# Patient Record
Sex: FEMALE | Race: WHITE | Employment: FULL TIME | URBAN - METROPOLITAN AREA
[De-identification: names, ages, dates, MRNs, and addresses within clinical notes are randomized per-mention and may not be internally consistent; named-entity substitution may affect disease eponyms.]

---

## 2017-03-07 ENCOUNTER — OFFICE VISIT (OUTPATIENT)
Dept: INTERNAL MEDICINE CLINIC | Age: 37
End: 2017-03-07

## 2017-03-07 VITALS
HEART RATE: 61 BPM | WEIGHT: 180.2 LBS | BODY MASS INDEX: 28.96 KG/M2 | OXYGEN SATURATION: 99 % | SYSTOLIC BLOOD PRESSURE: 108 MMHG | HEIGHT: 66 IN | DIASTOLIC BLOOD PRESSURE: 65 MMHG | TEMPERATURE: 99.5 F | RESPIRATION RATE: 16 BRPM

## 2017-03-07 DIAGNOSIS — F41.9 ANXIETY: ICD-10-CM

## 2017-03-07 DIAGNOSIS — Z00.00 WELLNESS EXAMINATION: Primary | ICD-10-CM

## 2017-03-07 DIAGNOSIS — M79.675 PAIN OF TOE OF LEFT FOOT: ICD-10-CM

## 2017-03-07 RX ORDER — SERTRALINE HYDROCHLORIDE 100 MG/1
100 TABLET, FILM COATED ORAL DAILY
Qty: 30 TAB | Refills: 5 | Status: SHIPPED | OUTPATIENT
Start: 2017-03-07 | End: 2017-09-14 | Stop reason: SDUPTHER

## 2017-03-07 RX ORDER — FLUTICASONE PROPIONATE 50 MCG
2 SPRAY, SUSPENSION (ML) NASAL DAILY
COMMUNITY

## 2017-03-07 NOTE — MR AVS SNAPSHOT
Visit Information Date & Time Provider Department Dept. Phone Encounter #  
 3/7/2017 10:15 AM Starla Ledesma MD [facility] [address] [id number] Follow-up Instructions Return in about 6 months (around 9/7/2017). Upcoming Health Maintenance Date Due DTaP/Tdap/Td series (1 - Tdap) 6/5/2003 INFLUENZA AGE 9 TO ADULT 8/1/2016 PAP AKA CERVICAL CYTOLOGY 8/26/2019 Allergies as of 3/7/2017  Review Complete On: 3/7/2017 By: Starla Ledesma MD  
 No Known Allergies Current Immunizations  Never Reviewed No immunizations on file. Not reviewed this visit You Were Diagnosed With   
  
 Codes Comments Wellness examination    -  Primary ICD-10-CM: Z00.00 ICD-9-CM: V70.0 Pain of toe of left foot     ICD-10-CM: X76.457 ICD-9-CM: 729.5 Anxiety     ICD-10-CM: F41.9 ICD-9-CM: 300.00 Vitals BP Pulse Temp Resp Height(growth percentile) Weight(growth percentile) 108/65 (BP 1 Location: Left arm, BP Patient Position: Sitting) 61 99.5 °F (37.5 °C) (Oral) 16 5' 6\" (1.676 m) 180 lb 3.2 oz (81.7 kg) LMP SpO2 BMI OB Status Smoking Status 02/14/2017 (Exact Date) 99% 29.09 kg/m2 Having regular periods Former Smoker Vitals History BMI and BSA Data Body Mass Index Body Surface Area  
 29.09 kg/m 2 1.95 m 2 Preferred Pharm[facility] Phone [id number], [address] [facility] [phone] Your Updated Medication List  
  
   
This list is accurate as of: 3/7/17 11:02 AM.  Always use your most recent med list.  
  
  
  
  
 FLONASE 50 mcg/actuation nasal spray Generic drug:  fluticasone 2 Sprays by Both Nostrils route daily. norgestimate-ethinyl estradiol 0.25-35 mg-mcg Tab Commonly known as:  MONONESSA (28) Take 1 Tab by mouth daily. sertraline 100 mg tablet Commonly known as:  ZOLOFT Take 1 Tab by mouth daily. Prescriptions Sent to Pharmacy Refills  
 sertraline (ZOLOFT) 100 mg tablet 5 Sig: Take 1 Tab by mouth daily. Class: Normal  
 Pharmacy: [address]  #: [phone] Route: Oral  
  
We Performed the Following REFERRAL TO PODIATRY [REF90 Custom] Comments:  
 Please evaluate patient for L great toe pain. Follow-up Instructions Return in about 6 months (around 9/7/2017). Referral Information Referral ID Referred By Referred To  
  
 [id number] [facility], [address] [facility] [address] Visits Status Start Date End Date 1 New Request 3/7/17 3/7/18 If your referral has a status of pending review or denied, additional information will be sent to support the outcome of this decision. Patient Instructions Well Visit, Ages 25 to 48: Care Instructions Your Care Instructions Physical exams can help you stay healthy. Your doctor has checked your overall health and may have suggested ways to take good care of yourself. He or she also may have recommended tests. At home, you can help prevent illness with healthy eating, regular exercise, and other steps. Follow-up care is a key part of your treatment and safety. Be sure to make and go to all appointments, and call your doctor if you are having problems. It's also a good idea to know your test results and keep a list of the medicines you take. How can you care for yourself at home? · Reach and stay at a healthy weight. This will lower your risk for many problems, such as obesity, diabetes, heart disease, and high blood pressure. · Get at least 30 minutes of physical activity on most days of the week. Walking is a good choice. You also may want to do other activities, such as running, swimming, cycling, or playing tennis or team sports. Discuss any changes in your exercise program with your doctor. · Do not smoke or allow others to smoke around you. If you need help quitting, talk to your doctor about stop-smoking programs and medicines. These can increase your chances of quitting for good. · Talk to your doctor about whether you have any risk factors for sexually transmitted infections (STIs). Having one sex partner (who does not have STIs and does not have sex with anyone else) is a good way to avoid these infections. · Use birth control if you do not want to have children at this time. Talk with your doctor about the choices available and what might be best for you. · Protect your skin from too much sun. When you're outdoors from 10 a.m. to 4 p.m., stay in the shade or cover up with clothing and a hat with a wide brim. Wear sunglasses that block UV rays. Even when it's cloudy, put broad-spectrum sunscreen (SPF 30 or higher) on any exposed skin. · See a dentist one or two times a year for checkups and to have your teeth cleaned. · Wear a seat belt in the car. · Drink alcohol in moderation, if at all. That means no more than 2 drinks a day for men and 1 drink a day for women. Follow your doctor's advice about when to have certain tests. These tests can spot problems early. For everyone · Cholesterol. Have the fat (cholesterol) in your blood tested after age 21. Your doctor will tell you how often to have this done based on your age, family history, or other things that can increase your risk for heart disease. · Blood pressure. Have your blood pressure checked during a routine doctor visit. Your doctor will tell you how often to check your blood pressure based on your age, your blood pressure results, and other factors. · Vision. Talk with your doctor about how often to have a glaucoma test. 
· Diabetes. Ask your doctor whether you should have tests for diabetes. · Colon cancer. Have a test for colon cancer at age 48.  You may have one of several tests. If you are younger than 48, you may need a test earlier if you have any risk factors. Risk factors include whether you already had a precancerous polyp removed from your colon or whether your parent, brother, sister, or child has had colon cancer. For women · Breast exam and mammogram. Talk to your doctor about when you should have a clinical breast exam and a mammogram. Medical experts differ on whether and how often women under 50 should have these tests. Your doctor can help you decide what is right for you. · Pap test and pelvic exam. Begin Pap tests at age 24. A Pap test is the best way to find cervical cancer. The test often is part of a pelvic exam. Ask how often to have this test. 
· Tests for sexually transmitted infections (STIs). Ask whether you should have tests for STIs. You may be at risk if you have sex with more than one person, especially if your partners do not wear condoms. For men · Tests for sexually transmitted infections (STIs). Ask whether you should have tests for STIs. You may be at risk if you have sex with more than one person, especially if you do not wear a condom. · Testicular cancer exam. Ask your doctor whether you should check your testicles regularly. · Prostate exam. Talk to your doctor about whether you should have a blood test (called a PSA test) for prostate cancer. Experts differ on whether and when men should have this test. Some experts suggest it if you are older than 39 and are -American or have a father or brother who got prostate cancer when he was younger than 72. When should you call for help? Watch closely for changes in your health, and be sure to contact your doctor if you have any problems or symptoms that concern you. Where can you learn more? Go to http://americo-aparna.info/. Enter P072 in the search box to learn more about \"Well Visit, Ages 25 to 48: Care Instructions. \" Current as of: July 19, 2016 Content Version: 11.1 © 8872-5028 [employer], [employer]. Care instructions adapted under license by [employer] (which disclaims liability or warranty for this information). If you have questions about a medical condition or this instruction, always ask your healthcare professional. [address] any warranty or liability for your use of this information. Introducing [facility]! Dear Richard Verdugo: Thank you for requesting a HealthyOut account. Our records indicate that you already have an active HealthyOut account. You can access your account anytime at [email or web address]. [email or web address] Did you know that you can access your hospital and ER discharge instructions at any time in HealthyOut? You can also review all of your test results from your hospital stay or ER visit. Additional Information If you have questions, please visit the Frequently Asked Questions section of the HealthyOut website at [email or web address] Remember, HealthyOut is NOT to be used for urgent needs. For medical emergencies, dial 911. Now available from your iPhone and Android! Please provide this summary of care documentation to your next provider. Your primary care clinician is listed as Joseluis Jackson. If you have any questions after today's visit, please call [phone].

## 2017-03-07 NOTE — PROGRESS NOTES
Kelly Almendarez is a 31 y.o. female who presents today for Establish Care and Foot Pain (pt c/o left foot pain x few months; unable to wear some shoes due to big toe pain )      She has a history of   Patient Active Problem List   Diagnosis Code    Anxiety F41.9    Pain of toe of left foot M79.675       Today patient is here to establish care. Previous PCP none. she is switching because establish care to establish care. Records are not available for me to review. she does have other concerns. Musculoskeletal pain:  She wishes to address discomfort in the Left large toe at today's visit. This has been moderate in nature, gradual, starting about 3 years ago in onset. Pain in certain position or with certain shoes but no  rash, erythema of joints, joint swelling has been noted by the patient. Self treatment: medication not used . she does not have a history of osteoarthritis, rheumatoid, gout joint disease. Anxiety: on SSRI. Was on lexapro in the past. Issues with poor mood and social anxiety. Now doing well on zoloft. Now taking 75mg. Pt notes some increased anxiety. Interested in going up to 100mg. Health maintenance hx includes:  Exercise: moderately active. 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 Moved here in June. At home with BF. Sexual history: HPV in the past. No pregnancies. Family: Mother with no issues. Father with HTN, HLD, CAD with stents    No cancer history. MGF with RA. Screening:    Breast cancer screening: N/A   Cervical cancer screening: last PAP/Pelvic exam: 2016   and was normal. OBGYN: Dr. Manuela Neal; On OCP    Immunizations: There is no immunization history on file for this patient. Immunization status: stated as current, but no records available.       ROS  Review of Systems   Constitutional: Negative for chills, fever and weight loss. HENT: Negative for congestion and sore throat. Eyes: Negative for blurred vision, double vision and photophobia. Respiratory: Negative for cough, hemoptysis, sputum production and shortness of breath. Cardiovascular: Negative for chest pain, palpitations, orthopnea, claudication and leg swelling. Gastrointestinal: Negative for abdominal pain, constipation, diarrhea, heartburn, nausea and vomiting. Genitourinary: Negative for dysuria, frequency and urgency. Musculoskeletal: Positive for joint pain (Toe pain). Negative for myalgias. Skin: Negative for rash. Neurological: Negative. Negative for headaches. Endo/Heme/Allergies: Does not bruise/bleed easily. Psychiatric/Behavioral: Negative for depression, hallucinations, memory loss, substance abuse and suicidal ideas. The patient is nervous/anxious. The patient does not have insomnia. Visit Vitals    /65 (BP 1 Location: Left arm, BP Patient Position: Sitting)    Pulse 61    Temp 99.5 °F (37.5 °C) (Oral)    Resp 16    Ht 5' 6\" (1.676 m)    Wt 180 lb 3.2 oz (81.7 kg)    SpO2 99%    BMI 29.09 kg/m2       Physical Exam   Constitutional: She is oriented to person, place, and time and well-developed, well-nourished, and in no distress. No distress. HENT:   Head: Normocephalic and atraumatic. Mouth/Throat: No oropharyngeal exudate. Eyes: Conjunctivae are normal. Pupils are equal, round, and reactive to light. No scleral icterus. Neck: Normal range of motion. No JVD present. No thyromegaly present. Cardiovascular: Normal rate and regular rhythm. Exam reveals no gallop and no friction rub. No murmur heard. Pulmonary/Chest: Effort normal and breath sounds normal. No respiratory distress. She has no wheezes. Abdominal: Soft. Bowel sounds are normal. She exhibits no distension. There is no rebound and no guarding. Musculoskeletal: Normal range of motion. She exhibits no edema.    No abnormality to [facility] toe. FROM. Neurological: She is alert and oriented to person, place, and time. No cranial nerve deficit. Skin: Skin is dry. No rash noted. Psychiatric: Mood, memory, affect and judgment normal.       Current Outpatient Prescriptions   Medication Sig    fluticasone (FLONASE) 50 mcg/actuation nasal spray 2 Sprays by Both Nostrils route daily.  sertraline (ZOLOFT) 100 mg tablet Take 1 Tab by mouth daily.  norgestimate-ethinyl estradiol (MONONESSA, 28,) 0.25-35 mg-mcg tab Take 1 Tab by mouth daily. No current facility-administered medications for this visit. Past Medical History:   Diagnosis Date    Anxiety     High cholesterol     HPV exposure       Past Surgical History:   Procedure Laterality Date    HX GYN  12/2013    Cryo    HX WISDOM TEETH EXTRACTION        Social History   Substance Use Topics    Smoking status: Former Smoker    Smokeless tobacco: Never Used    Alcohol use Yes      Family History   Problem Relation Age of Onset    High Cholesterol Father     Hypertension Father     Arthritis-rheumatoid Maternal Grandfather     No Known Problems Mother         No Known Allergies     Assessment/Plan  Maggie Martinez was seen today for establish care and foot pain. Diagnoses and all orders for this visit:    Wellness examination - GYN care done elsewhere. MH under good control. Discussed regular physical activity. Marquis Owens was counseled on age-appropriate/ guideline-based risk prevention behaviors and screening for a 31y.o. year old   female . We also discussed adjustments in screening based on family history if necessary. Printed instructions for preventative screening guidelines and healthy behaviors given to patient with after visit summary. Pain of toe of left foot - Chronic issue. No obvious structural abnormality. Refer to podiatry as this is becoming more symptomatic.   -     REFERRAL TO PODIATRY    Anxiety - Good control. On 75mg.  Will try 100mg for ease of dosing and due to increased stress. -     sertraline (ZOLOFT) 100 mg tablet; Take 1 Tab by mouth daily. Follow-up Disposition:  Return in about 6 months (around 9/7/2017). Daniella Zaman MD  3/7/2017      .

## 2017-03-07 NOTE — PATIENT INSTRUCTIONS

## 2017-09-01 ENCOUNTER — TELEPHONE (OUTPATIENT)
Dept: OBGYN CLINIC | Age: 37
End: 2017-09-01

## 2017-09-01 RX ORDER — NORGESTIMATE AND ETHINYL ESTRADIOL 0.25-0.035
1 KIT ORAL DAILY
Qty: 1 DOSE PACK | Refills: 0 | Status: SHIPPED | OUTPATIENT
Start: 2017-09-01 | End: 2017-09-08 | Stop reason: SDUPTHER

## 2017-09-01 NOTE — TELEPHONE ENCOUNTER
Patient has appointment for AE on 9/8/17. Prescription for one month supply of birth control sent to patient preferred pharmacy as per MD order.

## 2017-09-01 NOTE — TELEPHONE ENCOUNTER
Message left at 2:01PM      30year old patient last seen in the office on 8/26/17.  Patient left a message requesting a refill on her birth control      This nurse left a detailed message for the patient that she needs to contact the office to schedule an appointment prior to a refill being given

## 2017-09-08 ENCOUNTER — TELEPHONE (OUTPATIENT)
Dept: INTERNAL MEDICINE CLINIC | Age: 37
End: 2017-09-08

## 2017-09-08 ENCOUNTER — OFFICE VISIT (OUTPATIENT)
Dept: OBGYN CLINIC | Age: 37
End: 2017-09-08

## 2017-09-08 VITALS
BODY MASS INDEX: 28.77 KG/M2 | HEIGHT: 66 IN | SYSTOLIC BLOOD PRESSURE: 112 MMHG | WEIGHT: 179 LBS | DIASTOLIC BLOOD PRESSURE: 70 MMHG

## 2017-09-08 DIAGNOSIS — Z01.419 ENCOUNTER FOR GYNECOLOGICAL EXAMINATION WITHOUT ABNORMAL FINDING: Primary | ICD-10-CM

## 2017-09-08 DIAGNOSIS — Z01.419 ENCOUNTER FOR GYNECOLOGICAL EXAMINATION (GENERAL) (ROUTINE) WITHOUT ABNORMAL FINDINGS: Primary | ICD-10-CM

## 2017-09-08 RX ORDER — NORGESTIMATE AND ETHINYL ESTRADIOL 0.25-0.035
1 KIT ORAL DAILY
Qty: 1 DOSE PACK | Refills: 12 | Status: SHIPPED | OUTPATIENT
Start: 2017-09-08 | End: 2018-01-18 | Stop reason: SDUPTHER

## 2017-09-08 NOTE — TELEPHONE ENCOUNTER
Referral has been Obtained & Faxed To Dr Nasrin Pearce [phone]        [address] [facility] [id number]  No # Visits 99  Dates Covered 09/08/2017 - 09/08/2018

## 2017-09-08 NOTE — TELEPHONE ENCOUNTER
Patient referral      Dr. Omari Jara  [address] [place], [place]  ([phone]  Shiva Pack -  [phone]    9/8/17 3:00pm    Annual checkup

## 2017-09-08 NOTE — PROGRESS NOTES
Annual exam ages 21-44    Ed Jerry is a ,  30 y.o. female [facility] Patient's last menstrual period was 2017 (approximate). .    She presents for her annual checkup. She is having no significant problems. With regard to the Gardasil vaccine, she is older than the FDA approved age to receive it. Menstrual status:    Her periods are light, moderate in flow. She is using one to two pads or tampons per day, usually regular and last 26-30 days. She denies dysmenorrhea. She reports no premenstrual symptoms. Contraception:    The current method of family planning is OCP (estrogen/progesterone). Sexual history:     She  reports that she currently engages in sexual activity and has had male partners. She reports using the following method of birth control/protection: Pill. .    Medical conditions:    Since her last annual GYN exam about one year ago, she has not the following changes in her health history: none. Pap and Mammogram History:    Her most recent Pap smear was normal, obtained 1 year(s) ago. The patient has never had a mammogram.    Breast Cancer History/Substance Abuse: negative    Past Medical History:   Diagnosis Date    Anxiety     High cholesterol     HPV exposure     Routine Papanicolaou smear 16 neg HPV neg     Past Surgical History:   Procedure Laterality Date    HX GYN  2013    Cryo    HX WISDOM TEETH EXTRACTION         Current Outpatient Prescriptions   Medication Sig Dispense Refill    norgestimate-ethinyl estradiol (MONONESSA, 28,) 0.25-35 mg-mcg tab Take 1 Tab by mouth daily. 1 Dose Pack 0    fluticasone (FLONASE) 50 mcg/actuation nasal spray 2 Sprays by Both Nostrils route daily.  sertraline (ZOLOFT) 100 mg tablet Take 1 Tab by mouth daily. 30 Tab 5     Allergies: Review of patient's allergies indicates no known allergies. Tobacco History:  reports that she has quit smoking.  She has never used smokeless tobacco.  Alcohol Abuse:  reports that she drinks alcohol. Drug Abuse:  reports that she does not use illicit drugs.     Family Medical/Cancer History:   Family History   Problem Relation Age of Onset    High Cholesterol Father     Hypertension Father     Arthritis-rheumatoid Maternal Grandfather     No Known Problems Mother         Review of Systems - History obtained from the patient  Constitutional: negative for weight loss, fever, night sweats  HEENT: negative for hearing loss, earache, congestion, snoring, sorethroat  CV: negative for chest pain, palpitations, edema  Resp: negative for cough, shortness of breath, wheezing  GI: negative for change in bowel habits, abdominal pain, black or bloody stools  : negative for frequency, dysuria, hematuria, vaginal discharge  MSK: negative for back pain, joint pain, muscle pain  Breast: negative for breast lumps, nipple discharge, galactorrhea  Skin :negative for itching, rash, hives  Neuro: negative for dizziness, headache, confusion, weakness  Psych: negative for anxiety, depression, change in mood  Heme/lymph: negative for bleeding, bruising, pallor    Physical Exam    Visit Vitals    /70    Ht 5' 6\" (1.676 m)    Wt 179 lb (81.2 kg)    LMP 08/30/2017 (Approximate)    BMI 28.89 kg/m2       Constitutional  · Appearance: well-nourished, well developed, alert, in no acute distress    HENT  · Head and Face: appears normal    Neck  · Inspection/Palpation: normal appearance, no masses or tenderness  · Lymph Nodes: no lymphadenopathy present  · Thyroid: gland size normal, nontender, no nodules or masses present on palpation    Chest  · Respiratory Effort: breathing unlabored  · Auscultation: normal breath sounds    Cardiovascular  · Heart:  · Auscultation: regular rate and rhythm without murmur    Breasts  · Inspection of Breasts: breasts symmetrical, no skin changes, no discharge present, nipple appearance normal, no skin retraction present  · Palpation of Breasts and Axillae: no masses present on palpation, no breast tenderness  · Axillary Lymph Nodes: no lymphadenopathy present    Gastrointestinal  · Abdominal Examination: abdomen non-tender to palpation, normal bowel sounds, no masses present  · Liver and spleen: no hepatomegaly present, spleen not palpable  · Hernias: no hernias identified    Genitourinary  · External Genitalia: normal appearance for age, no discharge present, no tenderness present, no inflammatory lesions present, no masses present, no atrophy present  · Vagina: normal vaginal vault without central or paravaginal defects, no discharge present, no inflammatory lesions present, no masses present  · Bladder: non-tender to palpation  · Urethra: appears normal  · Cervix: normal   · Uterus: normal size, shape and consistency  · Adnexa: no adnexal tenderness present, no adnexal masses present  · Perineum: perineum within normal limits, no evidence of trauma, no rashes or skin lesions present  · Anus: anus within normal limits, no hemorrhoids present  · Inguinal Lymph Nodes: no lymphadenopathy present    Skin  · General Inspection: no rash, no lesions identified    Neurologic/Psychiatric  · Mental Status:  · Orientation: grossly oriented to person, place and time  · Mood and Affect: mood normal, affect appropriate    . Assessment:  Routine gynecologic examination  Her current medical status is satisfactory with no evidence of significant gynecologic issues.     Plan:  Counseled re: diet, exercise, healthy lifestyle  Return for yearly wellness visits  Gardisil counseling provided  Pt counseled regarding co-testing for high risk HPV with pap  Rec screening mammo at either 35 or 40

## 2017-09-14 ENCOUNTER — TELEPHONE (OUTPATIENT)
Dept: INTERNAL MEDICINE CLINIC | Age: 37
End: 2017-09-14

## 2017-09-14 DIAGNOSIS — F41.9 ANXIETY: ICD-10-CM

## 2017-09-14 RX ORDER — SERTRALINE HYDROCHLORIDE 100 MG/1
100 TABLET, FILM COATED ORAL DAILY
Qty: 30 TAB | Refills: 0 | Status: SHIPPED | OUTPATIENT
Start: 2017-09-14 | End: 2017-09-19 | Stop reason: SDUPTHER

## 2017-09-14 NOTE — TELEPHONE ENCOUNTER
----- Message from Christo Art sent at 9/13/2017  8:28 PM EDT -----  Regarding: Dr. Marylen Odor / Rx refill  Contact: [phone]  Pt requested a Rx refill on Sertraline 100 mgs called into [facility], pharmacy on file.

## 2017-09-19 ENCOUNTER — OFFICE VISIT (OUTPATIENT)
Dept: INTERNAL MEDICINE CLINIC | Age: 37
End: 2017-09-19

## 2017-09-19 VITALS
WEIGHT: 181 LBS | TEMPERATURE: 98 F | SYSTOLIC BLOOD PRESSURE: 108 MMHG | HEIGHT: 66 IN | HEART RATE: 80 BPM | RESPIRATION RATE: 16 BRPM | DIASTOLIC BLOOD PRESSURE: 64 MMHG | OXYGEN SATURATION: 98 % | BODY MASS INDEX: 29.09 KG/M2

## 2017-09-19 DIAGNOSIS — L03.031 ONYCHIA OF TOE, RIGHT: ICD-10-CM

## 2017-09-19 DIAGNOSIS — F41.9 ANXIETY: Primary | ICD-10-CM

## 2017-09-19 DIAGNOSIS — F51.01 PRIMARY INSOMNIA: ICD-10-CM

## 2017-09-19 DIAGNOSIS — M79.675 PAIN OF TOE OF LEFT FOOT: ICD-10-CM

## 2017-09-19 RX ORDER — SERTRALINE HYDROCHLORIDE 100 MG/1
100 TABLET, FILM COATED ORAL DAILY
Qty: 30 TAB | Refills: 11 | Status: SHIPPED | OUTPATIENT
Start: 2017-09-19 | End: 2017-10-23 | Stop reason: SDUPTHER

## 2017-09-19 NOTE — MR AVS SNAPSHOT
Visit Information Date & Time Provider Department Dept. Phone Encounter #  
 9/19/2017  8:00 AM Kathy Espino MD [facility] [address] [id number] Follow-up Instructions Return in about 1 year (around 9/19/2018). Upcoming Health Maintenance Date Due DTaP/Tdap/Td series (1 - Tdap) 6/5/2003 PAP AKA CERVICAL CYTOLOGY 8/26/2019 Allergies as of 9/19/2017  Review Complete On: 5/87/4346 By: Michelle Huddleston Wears No Known Allergies Current Immunizations  Never Reviewed Name Date Influenza Vaccine (Quad) PF 9/8/2017 Not reviewed this visit You Were Diagnosed With   
  
 Codes Comments Anxiety    -  Primary ICD-10-CM: F41.9 ICD-9-CM: 300.00 Pain of toe of left foot     ICD-10-CM: I85.077 ICD-9-CM: 729.5 Onychia of toe, right     ICD-10-CM: L03.031 
ICD-9-CM: 681.11 Primary insomnia     ICD-10-CM: F51.01 
ICD-9-CM: 307.42 Vitals BP Pulse Temp Resp Height(growth percentile) Weight(growth percentile) 108/64 (BP 1 Location: Left arm, BP Patient Position: Sitting) 80 98 °F (36.7 °C) (Oral) 16 5' 6\" (1.676 m) 181 lb (82.1 kg) LMP SpO2 BMI OB Status Smoking Status 08/30/2017 (Approximate) 98% 29.21 kg/m2 Having regular periods Former Smoker Vitals History BMI and BSA Data Body Mass Index Body Surface Area  
 29.21 kg/m 2 1.96 m 2 Preferred Pharmacy Pharmacy Name Phone [id number], [address] [place] [phone] Your Updated Medication List  
  
   
This list is accurate as of: 9/19/17  8:31 AM.  Always use your most recent med list.  
  
  
  
  
 FLONASE 50 mcg/actuation nasal spray Generic drug:  fluticasone 2 Sprays by Both Nostrils route daily. norgestimate-ethinyl estradiol 0.25-35 mg-mcg Tab Commonly known as:  MONONESSA (28) Take 1 Tab by mouth daily. sertraline 100 mg tablet Commonly known as:  ZOLOFT  
 Take 1 Tab by mouth daily. Prescriptions Sent to Pharmacy Refills  
 sertraline (ZOLOFT) 100 mg tablet 11 Sig: Take 1 Tab by mouth daily. Class: Normal  
 Pharmacy: [facility], [address]  #: [phone] Route: Oral  
  
We Performed the Following REFERRAL TO PODIATRY [REF90 Custom] Comments:  
 Please evaluate patient for L big toe pain. SLEEP MEDICINE REFERRAL [QMH402 Custom] Comments:  
 Please evaluate patient for insomnia. Follow-up Instructions Return in about 1 year (around 9/19/2018). Referral Information Referral ID Referred By Referred To  
  
 [id number] [facility], [address] [facility], [facility] [address] [facility], [address] [place] [address] Phone: [phone] Fax: [phone] Visits Status Start Date End Date 1 New Request 9/19/17 9/19/18 If your referral has a status of pending review or denied, additional information will be sent to support the outcome of this decision. Referral ID Referred By Referred To  
 [id number] Anaya LARSON MD  
   [address] [address] Phone: [phone] Fax: [phone] Visits Status Start Date End Date 1 New Request 9/19/17 9/19/18 If your referral has a status of pending review or denied, additional information will be sent to support the outcome of this decision. Introducing [facility]! Dear Francisca Starks: Thank you for requesting a [employer] account. Our records indicate that you already have an active [employer] account. You can access your account anytime at [email or web address] Did you know that you can access your hospital and ER discharge instructions at any time in [employer]? You can also review all of your test results from your hospital stay or ER visit. Additional Information If you have questions, please visit the Frequently Asked Questions section of the [employer] website at [email or web address] Remember, [employer] is NOT to be used for urgent needs. For medical emergencies, dial 911. Now available from your iPhone and Android! Please provide this summary of care documentation to your next provider. Your primary care clinician is listed as Musa Squires. If you have any questions after today's visit, please call [phone].

## 2017-09-19 NOTE — PROGRESS NOTES
Faina Mukherjee is a 30 y.o. female who presents today for Anxiety and Toe Pain (left foot big toe, and right foot )  . She has a history of   Patient Active Problem List   Diagnosis Code    Anxiety F41.9    Pain of toe of left foot M79.675   . Today patient is here for f/u.   she does not have other concerns. Anxiety  Patient is seen for anxiety disorder. Current treatment includes SSRI and no other therapies. Ongoing symptoms include: none. Patient denies: sweating, chest pain, dizziness, paresthesias, racing thoughts, feelings of losing control, difficulty concentrating, suicidal ideation. Denies substance or alcohol abuse. Reported side effects from the treatment: none. Wakes up multiple times throughout the night. Often tired during the day. Problem visit:  Faina Mukherjee is here for complaint of L big toe pain. Problem began 6 month(s) ago. Severity is moderate  Character of problem: Deep pain. Use makes the problem worse. Rest makes the problem better. Associated symptoms include: none, no swelling. Treatments tried include: Nothing. Also has toenail fungus of the R middle. [place].       ROS  Review of Systems   Constitutional: Negative for chills, fever and weight loss. Respiratory: Negative for cough and shortness of breath. Cardiovascular: Negative for chest pain, palpitations and leg swelling. Gastrointestinal: Negative for abdominal pain, nausea and vomiting. Genitourinary: Negative for dysuria, frequency and urgency. Musculoskeletal: Negative for myalgias and neck pain. Neurological: Negative. Endo/Heme/Allergies: Does not bruise/bleed easily. Psychiatric/Behavioral: Negative for depression, hallucinations, substance abuse and suicidal ideas. The patient is nervous/anxious and has insomnia.         Visit Vitals    /64 (BP 1 Location: Left arm, BP Patient Position: Sitting)    Pulse 80    Temp 98 °F (36.7 °C) (Oral)    Resp 16    Ht 5' 6\" (1.676 m)    Wt 181 lb (82.1 kg)    SpO2 98%    BMI 29.21 kg/m2       Physical Exam   Constitutional: She is oriented to person, place, and time. She appears well-developed and well-nourished. HENT:   Head: Normocephalic and atraumatic. Cardiovascular: Normal rate and regular rhythm. No murmur heard. Pulmonary/Chest: Effort normal. No respiratory distress. Musculoskeletal:   Mild swelling of L big toe. Neurological: She is alert and oriented to person, place, and time. Skin: Skin is warm and dry. Psychiatric: She has a normal mood and affect. Her behavior is normal.         Current Outpatient Prescriptions   Medication Sig    sertraline (ZOLOFT) 100 mg tablet Take 1 Tab by mouth daily.  norgestimate-ethinyl estradiol (MONONESSA, 28,) 0.25-35 mg-mcg tab Take 1 Tab by mouth daily.  fluticasone (FLONASE) 50 mcg/actuation nasal spray 2 Sprays by Both Nostrils route daily. No current facility-administered medications for this visit. Past Medical History:   Diagnosis Date    Anxiety     High cholesterol     HPV exposure     Routine Papanicolaou smear 8/26/16 neg HPV neg      Past Surgical History:   Procedure Laterality Date    HX GYN  12/2013    Cryo    HX WISDOM TEETH EXTRACTION        Social History   Substance Use Topics    Smoking status: Former Smoker    Smokeless tobacco: Never Used    Alcohol use Yes      Family History   Problem Relation Age of Onset    High Cholesterol Father     Hypertension Father     Arthritis-rheumatoid Maternal Grandfather     No Known Problems Mother         No Known Allergies     Assessment/Plan  Diagnoses and all orders for this visit:    1. Anxiety - stable on the 100mg dose. -     sertraline (ZOLOFT) 100 mg tablet; Take 1 Tab by mouth daily. 2. Pain of toe of left foot - chronic issue. Will refer to podiatry down stairs.   -     REFERRAL TO PODIATRY    3. Onychia of toe, right  -     REFERRAL TO PODIATRY    4. Primary insomnia - big issue.  S/e with melatonin. Overall anxiety stable. Refer for sleep study.   -     SLEEP MEDICINE REFERRAL        Follow-up Disposition:  Return in about 1 year (around 9/19/2018).     Devi Lynn MD  9/19/2017

## 2017-09-30 DIAGNOSIS — F41.9 ANXIETY: ICD-10-CM

## 2017-10-01 RX ORDER — SERTRALINE HYDROCHLORIDE 100 MG/1
TABLET, FILM COATED ORAL
Qty: 30 TAB | Refills: 5 | Status: SHIPPED | OUTPATIENT
Start: 2017-10-01 | End: 2018-12-17 | Stop reason: ALTCHOICE

## 2017-10-03 ENCOUNTER — TELEPHONE (OUTPATIENT)
Dept: INTERNAL MEDICINE CLINIC | Age: 37
End: 2017-10-03

## 2017-10-03 NOTE — TELEPHONE ENCOUNTER
Dr Piedad Zhou  (Foot and Ankle)    [facility] [address]    Phone [phone]    Fax [phone]    Left Big Toe Pain.  She just walked in to see her now         [address] [facility] ([facility]    [phone]

## 2017-10-05 NOTE — TELEPHONE ENCOUNTER
Referral has been Obtained & Faxed To Dr Valerie Lerma [phone]      Auth #  [id number]  No # Visits 99  Dates Covered  10/03/2017 - 10/03/2018

## 2017-10-09 ENCOUNTER — HOSPITAL ENCOUNTER (OUTPATIENT)
Dept: GENERAL RADIOLOGY | Age: 37
Discharge: HOME | End: 2017-10-09
Payer: COMMERCIAL

## 2017-10-09 DIAGNOSIS — M20.22: ICD-10-CM

## 2017-10-09 PROCEDURE — 73630 X-RAY EXAM OF FOOT: CPT

## 2017-10-23 DIAGNOSIS — F41.9 ANXIETY: ICD-10-CM

## 2017-10-23 RX ORDER — SERTRALINE HYDROCHLORIDE 100 MG/1
100 TABLET, FILM COATED ORAL DAILY
Qty: 90 TAB | Refills: 1 | Status: SHIPPED | OUTPATIENT
Start: 2017-10-23 | End: 2018-02-26 | Stop reason: SDUPTHER

## 2018-01-18 ENCOUNTER — TELEPHONE (OUTPATIENT)
Dept: OBGYN CLINIC | Age: 38
End: 2018-01-18

## 2018-01-18 RX ORDER — NORGESTIMATE AND ETHINYL ESTRADIOL 0.25-0.035
1 KIT ORAL DAILY
Qty: 3 PACKAGE | Refills: 2 | OUTPATIENT
Start: 2018-01-18 | End: 2018-10-01 | Stop reason: SDUPTHER

## 2018-01-18 NOTE — TELEPHONE ENCOUNTER
Patient's pharmacy calling to switch her monthly OCP rx to a 90 day supply. Pharmacist advised to change to a 90 day supply until 09/2018.

## 2018-02-26 DIAGNOSIS — F41.9 ANXIETY: ICD-10-CM

## 2018-02-26 RX ORDER — SERTRALINE HYDROCHLORIDE 100 MG/1
100 TABLET, FILM COATED ORAL DAILY
Qty: 90 TAB | Refills: 1 | Status: SHIPPED | OUTPATIENT
Start: 2018-02-26 | End: 2018-07-24 | Stop reason: SDUPTHER

## 2018-10-01 ENCOUNTER — OFFICE VISIT (OUTPATIENT)
Dept: OBGYN CLINIC | Age: 38
End: 2018-10-01

## 2018-10-01 VITALS
WEIGHT: 195 LBS | SYSTOLIC BLOOD PRESSURE: 104 MMHG | DIASTOLIC BLOOD PRESSURE: 68 MMHG | BODY MASS INDEX: 31.34 KG/M2 | HEIGHT: 66 IN

## 2018-10-01 DIAGNOSIS — Z01.419 ENCOUNTER FOR GYNECOLOGICAL EXAMINATION WITHOUT ABNORMAL FINDING: Primary | ICD-10-CM

## 2018-10-01 RX ORDER — NORGESTIMATE AND ETHINYL ESTRADIOL 0.25-0.035
1 KIT ORAL DAILY
Qty: 3 PACKAGE | Refills: 4 | Status: SHIPPED | OUTPATIENT
Start: 2018-10-01 | End: 2018-10-16 | Stop reason: SDUPTHER

## 2018-10-01 NOTE — PROGRESS NOTES
Annual exam ages 21-44    Aj Canseco is a ,  41 y.o. female [facility] Patient's last menstrual period was 2018 (exact date). .    She presents for her annual checkup. She is having no significant problems. With regard to the Gardasil vaccine, she is older than the FDA approved age to receive it. Menstrual status:    Her periods are light, moderate in flow. She is using one to two pads or tampons per day, usually regular and last 26-30 days. She denies dysmenorrhea. She reports no premenstrual symptoms. Contraception:    The current method of family planning is OCP (estrogen/progesterone). Sexual history:     She  reports that she currently engages in sexual activity and has had male partners. She reports using the following method of birth control/protection: Pill. .    Medical conditions:    Since her last annual GYN exam about one year ago, she has not the following changes in her health history: none. Pap and Mammogram History:    Her most recent Pap smear was normal, obtained 2 year(s) ago. The patient has never had a mammogram.    Breast Cancer History/Substance Abuse: negative    Past Medical History:   Diagnosis Date    Anxiety     High cholesterol     HPV exposure     Routine Papanicolaou smear 16 neg HPV neg     Past Surgical History:   Procedure Laterality Date    HX GYN  2013    Cryo    HX WISDOM TEETH EXTRACTION         Current Outpatient Prescriptions   Medication Sig Dispense Refill    norgestimate-ethinyl estradiol (MONONESSA, 28,) 0.25-35 mg-mcg tab Take 1 Tab by mouth daily. 3 Package 2    sertraline (ZOLOFT) 100 mg tablet TAKE 1 TABLET EVERY DAY 30 Tab 5    fluticasone (FLONASE) 50 mcg/actuation nasal spray 2 Sprays by Both Nostrils route daily.  sertraline (ZOLOFT) 100 mg tablet TAKE 1 TABLET BY MOUTH  DAILY 90 Tab 1     Allergies: Review of patient's allergies indicates no known allergies.      Tobacco History: reports that she has quit smoking. She has never used smokeless tobacco.  Alcohol Abuse:  reports that she drinks alcohol. Drug Abuse:  reports that she does not use illicit drugs.     Family Medical/Cancer History:   Family History   Problem Relation Age of Onset    High Cholesterol Father     Hypertension Father     Arthritis-rheumatoid Maternal Grandfather     No Known Problems Mother         Review of Systems - History obtained from the patient  Constitutional: negative for weight loss, fever, night sweats  HEENT: negative for hearing loss, earache, congestion, snoring, sorethroat  CV: negative for chest pain, palpitations, edema  Resp: negative for cough, shortness of breath, wheezing  GI: negative for change in bowel habits, abdominal pain, black or bloody stools  : negative for frequency, dysuria, hematuria, vaginal discharge  MSK: negative for back pain, joint pain, muscle pain  Breast: negative for breast lumps, nipple discharge, galactorrhea  Skin :negative for itching, rash, hives  Neuro: negative for dizziness, headache, confusion, weakness  Psych: negative for anxiety, depression, change in mood  Heme/lymph: negative for bleeding, bruising, pallor    Physical Exam    Visit Vitals    /68    Ht 5' 6\" (1.676 m)    Wt 195 lb (88.5 kg)    LMP 09/18/2018 (Exact Date)    BMI 31.47 kg/m2       Constitutional  · Appearance: well-nourished, well developed, alert, in no acute distress    HENT  · Head and Face: appears normal    Neck  · Inspection/Palpation: normal appearance, no masses or tenderness  · Lymph Nodes: no lymphadenopathy present  · Thyroid: gland size normal, nontender, no nodules or masses present on palpation    Chest  · Respiratory Effort: breathing unlabored  · Auscultation: normal breath sounds    Cardiovascular  · Heart:  · Auscultation: regular rate and rhythm without murmur    Breasts  · Inspection of Breasts: breasts symmetrical, no skin changes, no discharge present, nipple appearance normal, no skin retraction present  · Palpation of Breasts and Axillae: no masses present on palpation, no breast tenderness  · Axillary Lymph Nodes: no lymphadenopathy present    Gastrointestinal  · Abdominal Examination: abdomen non-tender to palpation, normal bowel sounds, no masses present  · Liver and spleen: no hepatomegaly present, spleen not palpable  · Hernias: no hernias identified    Genitourinary  · External Genitalia: normal appearance for age, no discharge present, no tenderness present, no inflammatory lesions present, no masses present, no atrophy present  · Vagina: normal vaginal vault without central or paravaginal defects, no discharge present, no inflammatory lesions present, no masses present  · Bladder: non-tender to palpation  · Urethra: appears normal  · Cervix: normal   · Uterus: normal size, shape and consistency  · Adnexa: no adnexal tenderness present, no adnexal masses present  · Perineum: perineum within normal limits, no evidence of trauma, no rashes or skin lesions present  · Anus: anus within normal limits, no hemorrhoids present  · Inguinal Lymph Nodes: no lymphadenopathy present    Skin  · General Inspection: no rash, no lesions identified    Neurologic/Psychiatric  · Mental Status:  · Orientation: grossly oriented to person, place and time  · Mood and Affect: mood normal, affect appropriate    . Assessment:  Routine gynecologic examination  Her current medical status is satisfactory with no evidence of significant gynecologic issues.     Plan:  Counseled re: diet, exercise, healthy lifestyle  Return for yearly wellness visits  Gardisil counseling provided  Pt counseled regarding co-testing for high risk HPV with pap  Rec screening mammo at either 37 or 42

## 2018-10-01 NOTE — PATIENT INSTRUCTIONS

## 2018-10-17 RX ORDER — NORGESTIMATE AND ETHINYL ESTRADIOL 0.25-0.035
1 KIT ORAL DAILY
Qty: 3 PACKAGE | Refills: 4 | Status: SHIPPED | OUTPATIENT
Start: 2018-10-17 | End: 2019-10-07 | Stop reason: SDUPTHER

## 2018-10-17 NOTE — TELEPHONE ENCOUNTER
Patient e-mail stating that her mail order pharmacy cannot fill her rx and new rx sent to local pharmacy.

## 2018-10-17 NOTE — TELEPHONE ENCOUNTER
From: Antelmo Marsh  To: Elizabeth Gracia MD  Sent: 10/16/2018 6:42 PM EDT  Subject: Medication Renewal Request    Original authorizing provider: MD Antelmo Graham would like a refill of the following medications:  norgestimate-ethinyl estradiol (Ottoniel Crawford, 28,) 0.25-35 mg-mcg tab Wandy Pinto MD]    Preferred pharmacy: [place] [id number]    Comment:  [facility] was not able to fill my prescription. Any chance you can. Call it into the [facility] on [address]?

## 2018-12-10 DIAGNOSIS — F41.9 ANXIETY: ICD-10-CM

## 2018-12-10 RX ORDER — SERTRALINE HYDROCHLORIDE 100 MG/1
100 TABLET, FILM COATED ORAL DAILY
Qty: 90 TAB | Refills: 1 | OUTPATIENT
Start: 2018-12-10 | End: 2019-03-10

## 2018-12-17 ENCOUNTER — OFFICE VISIT (OUTPATIENT)
Dept: INTERNAL MEDICINE CLINIC | Age: 38
End: 2018-12-17

## 2018-12-17 VITALS
HEART RATE: 96 BPM | SYSTOLIC BLOOD PRESSURE: 112 MMHG | TEMPERATURE: 99.1 F | DIASTOLIC BLOOD PRESSURE: 70 MMHG | BODY MASS INDEX: 31.66 KG/M2 | HEIGHT: 66 IN | RESPIRATION RATE: 16 BRPM | OXYGEN SATURATION: 98 % | WEIGHT: 197 LBS

## 2018-12-17 DIAGNOSIS — F41.9 ANXIETY: Primary | ICD-10-CM

## 2018-12-17 DIAGNOSIS — M79.675 PAIN OF TOE OF LEFT FOOT: ICD-10-CM

## 2018-12-17 RX ORDER — SERTRALINE HYDROCHLORIDE 100 MG/1
TABLET, FILM COATED ORAL
Qty: 90 TAB | Refills: 3 | Status: SHIPPED | OUTPATIENT
Start: 2018-12-17 | End: 2019-08-22 | Stop reason: SDUPTHER

## 2018-12-17 NOTE — PROGRESS NOTES
Markos Machado is a 41 y.o. female who presents today for Medication Evaluation and Anxiety  . She has a history of   Patient Active Problem List   Diagnosis Code    Anxiety F41.9    Pain of toe of left foot M79.675   . Today patient is here for f/u. 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    Anxiety -we did increase Zoloft dose to 100 mg when I first met her. Notes that her anxiety has been stable. Patient is seen for anxiety disorder. Current treatment includes SSRI and no other therapies. Ongoing symptoms include: none. Patient denies: sweating, chest pain, dizziness, paresthesias, racing thoughts, feelings of losing control, difficulty concentrating, suicidal ideation. Denies substance or alcohol abuse. Reported side effects from the treatment: none. Toe Pain: At send her to podiatry. She notes that this is arthritis. She has received injections which did not help any of her pain. At this point she notes that she is dealing with her pain. ROS  Review of Systems   Constitutional: Negative for chills, fever, malaise/fatigue and weight loss. Respiratory: Negative for cough and shortness of breath. Cardiovascular: Negative for chest pain, palpitations, orthopnea and leg swelling. Gastrointestinal: Negative for abdominal pain, nausea and vomiting. Genitourinary: Negative for dysuria, frequency and urgency. Musculoskeletal: Negative for back pain, myalgias and neck pain. Neurological: Negative. Endo/Heme/Allergies: Does not bruise/bleed easily. Psychiatric/Behavioral: Negative for depression, hallucinations, memory loss, substance abuse and suicidal ideas. The patient is nervous/anxious.         Visit Vitals  /70 (BP 1 Location: Left arm, BP Patient Position: Sitting)   Pulse 96   Temp 99.1 °F (37.3 °C) (Oral)   Resp 16   Ht 5' 6\" (1.676 m)   Wt 197 lb (89.4 kg)   SpO2 98%   BMI 31.80 kg/m²       Physical Exam Constitutional: She is oriented to person, place, and time. She appears well-developed and well-nourished. HENT:   Head: Normocephalic and atraumatic. Right Ear: External ear normal.   Left Ear: External ear normal.   Eyes: EOM are normal. Pupils are equal, round, and reactive to light. Neck: Normal range of motion. Neck supple. No thyromegaly present. Cardiovascular: Normal rate and regular rhythm. Exam reveals no friction rub. No murmur heard. Pulmonary/Chest: Effort normal. No stridor. No respiratory distress. Abdominal: Soft. Bowel sounds are normal. She exhibits no distension and no mass. There is no tenderness. There is no guarding. Musculoskeletal:   Toe pain   Neurological: She is alert and oriented to person, place, and time. Skin: Skin is warm and dry. Psychiatric: She has a normal mood and affect. Her behavior is normal.          Current Outpatient Medications   Medication Sig    sertraline (ZOLOFT) 100 mg tablet TAKE 1 TABLET BY MOUTH  DAILY    norgestimate-ethinyl estradiol (MONONESSA, 28,) 0.25-35 mg-mcg tab Take 1 Tab by mouth daily.  fluticasone (FLONASE) 50 mcg/actuation nasal spray 2 Sprays by Both Nostrils route daily. No current facility-administered medications for this visit. Past Medical History:   Diagnosis Date    Anxiety     High cholesterol     HPV exposure     Routine Papanicolaou smear 8/26/16 neg HPV neg      Past Surgical History:   Procedure Laterality Date    HX GYN  12/2013    Cryo    HX WISDOM TEETH EXTRACTION        Social History     Tobacco Use    Smoking status: Former Smoker    Smokeless tobacco: Never Used   Substance Use Topics    Alcohol use: Yes      Family History   Problem Relation Age of Onset    High Cholesterol Father     Hypertension Father     Arthritis-rheumatoid Maternal Grandfather     No Known Problems Mother         No Known Allergies     Assessment/Plan  Diagnoses and all orders for this visit:    1.  Anxiety -well controlled on this dose. Continue this. -     sertraline (ZOLOFT) 100 mg tablet; TAKE 1 TABLET BY MOUTH  DAILY    2. Pain of toe of left foot -arthritis noted on imaging. No improvements with steroid injection. Discussed long-term NSAID use which she does not want to do at this point. She is not interested in surgical intervention. She will be trying to take curcumin to see if this helps with inflammation. Follow-up Disposition: Not on File    Teressa Fry MD  12/17/2018        Discussed the patient's BMI with her. The BMI follow up plan is as follows:     dietary management education, guidance, and counseling  encourage exercise  monitor weight  prescribed dietary intake    An After Visit Summary was printed and given to the patient.

## 2019-08-22 ENCOUNTER — OFFICE VISIT (OUTPATIENT)
Dept: INTERNAL MEDICINE CLINIC | Age: 39
End: 2019-08-22

## 2019-08-22 VITALS
HEART RATE: 83 BPM | SYSTOLIC BLOOD PRESSURE: 98 MMHG | HEIGHT: 66 IN | OXYGEN SATURATION: 99 % | RESPIRATION RATE: 16 BRPM | DIASTOLIC BLOOD PRESSURE: 68 MMHG | BODY MASS INDEX: 32.3 KG/M2 | WEIGHT: 201 LBS | TEMPERATURE: 98.2 F

## 2019-08-22 DIAGNOSIS — M19.079 ARTHRITIS OF BIG TOE: Primary | ICD-10-CM

## 2019-08-22 DIAGNOSIS — F41.9 ANXIETY: ICD-10-CM

## 2019-08-22 RX ORDER — LORATADINE AND PSEUDOEPHEDRINE 10; 240 MG/1; MG/1
1 TABLET, EXTENDED RELEASE ORAL DAILY
COMMUNITY

## 2019-08-22 RX ORDER — SERTRALINE HYDROCHLORIDE 100 MG/1
TABLET, FILM COATED ORAL
Qty: 90 TAB | Refills: 3 | Status: SHIPPED | OUTPATIENT
Start: 2019-08-22 | End: 2020-01-13 | Stop reason: SDUPTHER

## 2019-08-22 NOTE — PROGRESS NOTES
Enrique Khoury is a 42 y.o. female who presents today for Toe Pain and Form Completion  . She has a history of   Patient Active Problem List   Diagnosis Code    Anxiety F41.9    Pain of toe of left foot M79.675   . Today patient is here for f/u. Toe Arthritis: continues to be significant. She asks for completion of Gym form. I will provide her this. Has seen podiatrist. Will probably need surgery for this. Has failed injections. Anxiety -continue Zoloft 100 mg. She notes that her anxiety overall is stable. Patient is seen for anxiety disorder. Current treatment includes SSRI and no other therapies. Ongoing symptoms include: none. Patient denies: sweating, chest pain, dizziness, paresthesias, racing thoughts, feelings of losing control, difficulty concentrating, suicidal ideation. Denies substance or alcohol abuse. Reported side effects from the treatment: none. ROS  Review of Systems   Constitutional: Negative for chills, fever and weight loss. HENT: Negative for congestion, ear discharge, ear pain, hearing loss, nosebleeds and tinnitus. Eyes: Negative for blurred vision, double vision, photophobia, pain and discharge. Respiratory: Negative for cough and shortness of breath. Cardiovascular: Negative for chest pain, palpitations and leg swelling. Gastrointestinal: Negative for abdominal pain, nausea and vomiting. Skin: Negative for itching and rash. Neurological: Negative. Endo/Heme/Allergies: Does not bruise/bleed easily. Psychiatric/Behavioral: Negative for depression. The patient is not nervous/anxious. Visit Vitals  BP 98/68 (BP 1 Location: Left arm, BP Patient Position: Sitting)   Pulse 83   Temp 98.2 °F (36.8 °C) (Oral)   Resp 16   Ht 5' 6\" (1.676 m)   Wt 201 lb (91.2 kg)   SpO2 99%   BMI 32.44 kg/m²       Physical Exam   Constitutional: She is oriented to person, place, and time. She appears well-developed and well-nourished.    HENT:   Head: Normocephalic and atraumatic. Right Ear: External ear normal.   Left Ear: External ear normal.   Cardiovascular: Normal rate and regular rhythm. No murmur heard. Pulmonary/Chest: Effort normal. No respiratory distress. Neurological: She is alert and oriented to person, place, and time. Skin: Skin is warm and dry. Psychiatric: She has a normal mood and affect. Her behavior is normal.         Current Outpatient Medications   Medication Sig    loratadine-pseudoephedrine (CLARITIN-D 24 HOUR)  mg per tablet Take 1 Tab by mouth daily.  sertraline (ZOLOFT) 100 mg tablet TAKE 1 TABLET BY MOUTH  DAILY    norgestimate-ethinyl estradiol (MONONESSA, 28,) 0.25-35 mg-mcg tab Take 1 Tab by mouth daily.  fluticasone (FLONASE) 50 mcg/actuation nasal spray 2 Sprays by Both Nostrils route daily. No current facility-administered medications for this visit. Past Medical History:   Diagnosis Date    Anxiety     High cholesterol     HPV exposure     Routine Papanicolaou smear 8/26/16 neg HPV neg      Past Surgical History:   Procedure Laterality Date    HX GYN  12/2013    [facility]    HX WISDOM TEETH EXTRACTION        Social History     Tobacco Use    Smoking status: Former Smoker    Smokeless tobacco: Never Used   Substance Use Topics    Alcohol use: Yes      Family History   Problem Relation Age of Onset    High Cholesterol Father     Hypertension Father     Arthritis-rheumatoid Maternal Grandfather     No Known Problems Mother         No Known Allergies     Assessment/Plan  Diagnoses and all orders for this visit:    1. Arthritis of big toe -we will see the orthopedist to discuss potentially having the Cartiva implant. Severity of her symptoms at this time we filled out a form to allow her to get out of her gym membership.  -     REFERRAL TO ORTHOPEDICS    2. Anxiety -overall mental health is stable. We discussed that if this continues to be stable could consider decreasing Zoloft dose. -     sertraline (ZOLOFT) 100 mg tablet; TAKE 1 TABLET BY MOUTH  DAILY    Follow-up and Dispositions    · Return if symptoms worsen or fail to improve. Santiago Maradiaga MD  8/22/2019    This note was created with the help of speech recognition software Elysia Florian) and may contain some 'sound alike' errors. Discussed the patient's BMI with her. The BMI follow up plan is as follows:     dietary management education, guidance, and counseling  encourage exercise  monitor weight  prescribed dietary intake    An After Visit Summary was printed and given to the patient.

## 2019-08-22 NOTE — PATIENT INSTRUCTIONS
Body Mass Index: Care Instructions  Your Care Instructions    Body mass index (BMI) can help you see if your weight is raising your risk for health problems. It uses a formula to compare how much you weigh with how tall you are. · A BMI lower than 18.5 is considered underweight. · A BMI between 18.5 and 24.9 is considered healthy. · A BMI between 25 and 29.9 is considered overweight. A BMI of 30 or higher is considered obese. If your BMI is in the normal range, it means that you have a lower risk for weight-related health problems. If your BMI is in the overweight or obese range, you may be at increased risk for weight-related health problems, such as high blood pressure, heart disease, stroke, arthritis or joint pain, and diabetes. If your BMI is in the underweight range, you may be at increased risk for health problems such as fatigue, lower protection (immunity) against illness, muscle loss, bone loss, hair loss, and hormone problems. BMI is just one measure of your risk for weight-related health problems. You may be at higher risk for health problems if you are not active, you eat an unhealthy diet, or you drink too much alcohol or use tobacco products. Follow-up care is a key part of your treatment and safety. Be sure to make and go to all appointments, and call your doctor if you are having problems. It's also a good idea to know your test results and keep a list of the medicines you take. How can you care for yourself at home? · Practice healthy eating habits. This includes eating plenty of fruits, vegetables, whole grains, lean protein, and low-fat dairy. · If your doctor recommends it, get more exercise. Walking is a good choice. Bit by bit, increase the amount you walk every day. Try for at least 30 minutes on most days of the week. · Do not smoke. Smoking can increase your risk for health problems. If you need help quitting, talk to your doctor about stop-smoking programs and medicines. These can increase your chances of quitting for good. · Limit alcohol to 2 drinks a day for men and 1 drink a day for women. Too much alcohol can cause health problems. If you have a BMI higher than 25  · Your doctor may do other tests to check your risk for weight-related health problems. This may include measuring the distance around your waist. A waist measurement of more than 40 inches in men or 35 inches in women can increase the risk of weight-related health problems. · Talk with your doctor about steps you can take to stay healthy or improve your health. You may need to make lifestyle changes to lose weight and stay healthy, such as changing your diet and getting regular exercise. If you have a BMI lower than 18.5  · Your doctor may do other tests to check your risk for health problems. · Talk with your doctor about steps you can take to stay healthy or improve your health. You may need to make lifestyle changes to gain or maintain weight and stay healthy, such as getting more healthy foods in your diet and doing exercises to build muscle. Where can you learn more? Go to http://americo-aparna.info/. Enter S176 in the search box to learn more about \"Body Mass Index: Care Instructions. \"  Current as of: October 13, 2016  Content Version: 11.4  © 7956-8477 [employer], [employer]orporated. Care instructions adapted under license by [employer] (which disclaims liability or warranty for this information). If you have questions about a medical condition or this instruction, always ask your healthcare professional. [address] any warranty or liability for your use of this information.

## 2019-10-03 NOTE — PROGRESS NOTES
Annual exam ages 21-44    Murray Pettit is a ,  42 y.o. female   Patient's last menstrual period was 2019 (exact date). She presents for her annual checkup. She is having no significant problems. With regard to the Gardasil vaccine, she has not received it yet. Menstrual status:    Her periods are normal in flow. She is using three to ten pads or tampons per day, usually regular with a 26-32 day interval with 3-7 day duration. She does not have dysmenorrhea. She reports no premenstrual symptoms. Contraception:    The current method of family planning is OCP. Sexual history:    She  reports that she currently engages in sexual activity and has had partner(s) who are Male. She reports using the following method of birth control/protection: Pill. Medical conditions:    Since her last annual GYN exam about one year ago, she has not the following changes in her health history: left big toe arthritis. Surgical history confirmed with patient. has a past surgical history that includes hx gyn (2013) and hx wisdom teeth extraction. Pap and Mammogram History:    Her most recent Pap smear was normal, obtained 3 year(s) ago. The patient has never had a mammogram.    Breast Cancer History/Substance Abuse: negative    Past Medical History:   Diagnosis Date    Anxiety     High cholesterol     HPV exposure     Routine Papanicolaou smear 16 neg HPV neg     Past Surgical History:   Procedure Laterality Date    HX GYN  2013    Cryo    HX WISDOM TEETH EXTRACTION         Current Outpatient Medications   Medication Sig Dispense Refill    meloxicam (MOBIC) 15 mg tablet TAKE 1 TABLET BY MOUTH EVERY DAY AS NEEDED FOR PAIN  1    loratadine-pseudoephedrine (CLARITIN-D 24 HOUR)  mg per tablet Take 1 Tab by mouth daily.       sertraline (ZOLOFT) 100 mg tablet TAKE 1 TABLET BY MOUTH  DAILY 90 Tab 3    norgestimate-ethinyl estradiol (MONONESSA, 28,) 0.25-35 mg-mcg tab Take 1 Tab by mouth daily. 3 Package 4    fluticasone (FLONASE) 50 mcg/actuation nasal spray 2 Sprays by Both Nostrils route daily. Allergies: Patient has no known allergies. Tobacco History:  reports that she has quit smoking. She has never used smokeless tobacco.  Alcohol Abuse:  reports that she drinks alcohol. Drug Abuse:  reports that she does not use drugs.     Family Medical/Cancer History:   Family History   Problem Relation Age of Onset    High Cholesterol Father     Hypertension Father     Arthritis-rheumatoid Maternal Grandfather     No Known Problems Mother         Review of Systems - History obtained from the patient  Constitutional: negative for weight loss, fever, night sweats  HEENT: negative for hearing loss, earache, congestion, snoring, sorethroat  CV: negative for chest pain, palpitations, edema  Resp: negative for cough, shortness of breath, wheezing  GI: negative for change in bowel habits, abdominal pain, black or bloody stools  : negative for frequency, dysuria, hematuria, vaginal discharge  MSK: negative for back pain, joint pain, muscle pain  Breast: negative for breast lumps, nipple discharge, galactorrhea  Skin :negative for itching, rash, hives  Neuro: negative for dizziness, headache, confusion, weakness  Psych: negative for anxiety, depression, change in mood  Heme/lymph: negative for bleeding, bruising, pallor    Physical Exam    Visit Vitals  /68   Ht 5' 6\" (1.676 m)   Wt 201 lb 6.4 oz (91.4 kg)   LMP 09/17/2019 (Exact Date)   BMI 32.51 kg/m²       Constitutional  · Appearance: well-nourished, well developed, alert, in no acute distress    HENT  · Head and Face: appears normal    Neck  · Inspection/Palpation: normal appearance, no masses or tenderness  · Lymph Nodes: no lymphadenopathy present  · Thyroid: gland size normal, nontender, no nodules or masses present on palpation    Chest  · Respiratory Effort: breathing unlabored  · Auscultation: normal breath sounds    Cardiovascular  · Heart:  · Auscultation: regular rate and rhythm without murmur    Breasts  · Inspection of Breasts: breasts symmetrical, no skin changes, no discharge present, nipple appearance normal, no skin retraction present  · Palpation of Breasts and Axillae: no masses present on palpation, no breast tenderness  · Axillary Lymph Nodes: no lymphadenopathy present    Gastrointestinal  · Abdominal Examination: abdomen non-tender to palpation, normal bowel sounds, no masses present  · Liver and spleen: no hepatomegaly present, spleen not palpable  · Hernias: no hernias identified    Genitourinary  · External Genitalia: normal appearance for age, no discharge present, no tenderness present, no inflammatory lesions present, no masses present, no atrophy present  · Vagina: normal vaginal vault without central or paravaginal defects, no discharge present, no inflammatory lesions present, no masses present  · Bladder: non-tender to palpation  · Urethra: appears normal  · Cervix: normal   · Uterus: normal size, shape and consistency  · Adnexa: no adnexal tenderness present, no adnexal masses present  · Perineum: perineum within normal limits, no evidence of trauma, no rashes or skin lesions present  · Anus: anus within normal limits, no hemorrhoids present  · Inguinal Lymph Nodes: no lymphadenopathy present    Skin  · General Inspection: no rash, no lesions identified    Neurologic/Psychiatric  · Mental Status:  · Orientation: grossly oriented to person, place and time  · Mood and Affect: mood normal, affect appropriate    . Assessment:  Routine gynecologic examination  Her current medical status is satisfactory with no evidence of significant gynecologic issues.     Plan:  Counseled re: diet, exercise, healthy lifestyle  Return for yearly wellness visits  Gardisil counseling provided  Pt counseled regarding co-testing for high risk HPV with pap  Rec screening mammo at either 35 or 40

## 2019-10-07 ENCOUNTER — OFFICE VISIT (OUTPATIENT)
Dept: OBGYN CLINIC | Age: 39
End: 2019-10-07

## 2019-10-07 VITALS
SYSTOLIC BLOOD PRESSURE: 124 MMHG | WEIGHT: 201.4 LBS | DIASTOLIC BLOOD PRESSURE: 68 MMHG | BODY MASS INDEX: 32.37 KG/M2 | HEIGHT: 66 IN

## 2019-10-07 DIAGNOSIS — Z01.419 ENCOUNTER FOR ANNUAL ROUTINE GYNECOLOGICAL EXAMINATION: Primary | ICD-10-CM

## 2019-10-07 RX ORDER — NORGESTIMATE AND ETHINYL ESTRADIOL 0.25-0.035
1 KIT ORAL DAILY
Qty: 3 PACKAGE | Refills: 4 | Status: SHIPPED | OUTPATIENT
Start: 2019-10-07 | End: 2020-10-27 | Stop reason: SDUPTHER

## 2019-10-07 RX ORDER — MELOXICAM 15 MG/1
TABLET ORAL
Refills: 1 | COMMUNITY
Start: 2019-09-30 | End: 2020-01-13

## 2019-10-07 NOTE — PATIENT INSTRUCTIONS
Well Visit, Ages 25 to 48: Care Instructions  Your Care Instructions    Physical exams can help you stay healthy. Your doctor has checked your overall health and may have suggested ways to take good care of yourself. He or she also may have recommended tests. At home, you can help prevent illness with healthy eating, regular exercise, and other steps. Follow-up care is a key part of your treatment and safety. Be sure to make and go to all appointments, and call your doctor if you are having problems. It's also a good idea to know your test results and keep a list of the medicines you take. How can you care for yourself at home? · Reach and stay at a healthy weight. This will lower your risk for many problems, such as obesity, diabetes, heart disease, and high blood pressure. · Get at least 30 minutes of physical activity on most days of the week. Walking is a good choice. You also may want to do other activities, such as running, swimming, cycling, or playing tennis or team sports. Discuss any changes in your exercise program with your doctor. · Do not smoke or allow others to smoke around you. If you need help quitting, talk to your doctor about stop-smoking programs and medicines. These can increase your chances of quitting for good. · Talk to your doctor about whether you have any risk factors for sexually transmitted infections (STIs). Having one sex partner (who does not have STIs and does not have sex with anyone else) is a good way to avoid these infections. · Use birth control if you do not want to have children at this time. Talk with your doctor about the choices available and what might be best for you. · Protect your skin from too much sun. When you're outdoors from 10 a.m. to 4 p.m., stay in the shade or cover up with clothing and a hat with a wide brim. Wear sunglasses that block UV rays. Even when it's cloudy, put broad-spectrum sunscreen (SPF 30 or higher) on any exposed skin.   · See a dentist one or two times a year for checkups and to have your teeth cleaned. · Wear a seat belt in the car. Follow your doctor's advice about when to have certain tests. These tests can spot problems early. For everyone  · Cholesterol. Have the fat (cholesterol) in your blood tested after age 21. Your doctor will tell you how often to have this done based on your age, family history, or other things that can increase your risk for heart disease. · Blood pressure. Have your blood pressure checked during a routine doctor visit. Your doctor will tell you how often to check your blood pressure based on your age, your blood pressure results, and other factors. · Vision. Talk with your doctor about how often to have a glaucoma test.  · Diabetes. Ask your doctor whether you should have tests for diabetes. · Colon cancer. Your risk for colorectal cancer gets higher as you get older. Some experts say that adults should start regular screening at age 48 and stop at age 76. Others say to start before age 48 or continue after age 76. Talk with your doctor about your risk and when to start and stop screening. For women  · Breast exam and mammogram. Talk to your doctor about when you should have a clinical breast exam and a mammogram. Medical experts differ on whether and how often women under 50 should have these tests. Your doctor can help you decide what is right for you. · Cervical cancer screening test and pelvic exam. Begin with a Pap test at age 24. The test often is part of a pelvic exam. Starting at age 27, you may choose to have a Pap test, an HPV test, or both tests at the same time (called co-testing). Talk with your doctor about how often to have testing. · Tests for sexually transmitted infections (STIs). Ask whether you should have tests for STIs. You may be at risk if you have sex with more than one person, especially if your partners do not wear condoms.   For men  · Tests for sexually transmitted infections (STIs). Ask whether you should have tests for STIs. You may be at risk if you have sex with more than one person, especially if you do not wear a condom. · Testicular cancer exam. Ask your doctor whether you should check your testicles regularly. · Prostate exam. Talk to your doctor about whether you should have a blood test (called a PSA test) for prostate cancer. Experts differ on whether and when men should have this test. Some experts suggest it if you are older than 39 and are -American or have a father or brother who got prostate cancer when he was younger than 72. When should you call for help? Watch closely for changes in your health, and be sure to contact your doctor if you have any problems or symptoms that concern you. Where can you learn more? Go to http://americo-aparna.info/. Enter P072 in the search box to learn more about \"Well Visit, Ages 25 to 48: Care Instructions. \"  Current as of: December 13, 2018  Content Version: 12.2  © 9108-9982 [employer]. Care instructions adapted under license by [employer] (which disclaims liability or warranty for this information). If you have questions about a medical condition or this instruction, always ask your healthcare professional. [place] [address] any warranty or liability for your use of this information.

## 2019-10-10 LAB
CYTOLOGIST CVX/VAG CYTO: NORMAL
CYTOLOGY CVX/VAG DOC CYTO: NORMAL
CYTOLOGY CVX/VAG DOC THIN PREP: NORMAL
CYTOLOGY HISTORY:: NORMAL
DX ICD CODE: NORMAL
HPV I/H RISK 1 DNA CVX QL PROBE+SIG AMP: NEGATIVE
Lab: NORMAL
OTHER STN SPEC: NORMAL
STAT OF ADQ CVX/VAG CYTO-IMP: NORMAL

## 2019-12-03 RX ORDER — MONTELUKAST SODIUM 10 MG/1
10 TABLET ORAL DAILY
Qty: 30 TAB | Refills: 1 | Status: SHIPPED | OUTPATIENT
Start: 2019-12-03 | End: 2019-12-27

## 2020-01-13 ENCOUNTER — OFFICE VISIT (OUTPATIENT)
Dept: INTERNAL MEDICINE CLINIC | Age: 40
End: 2020-01-13

## 2020-01-13 VITALS
RESPIRATION RATE: 16 BRPM | DIASTOLIC BLOOD PRESSURE: 72 MMHG | TEMPERATURE: 98.2 F | WEIGHT: 202 LBS | BODY MASS INDEX: 32.47 KG/M2 | OXYGEN SATURATION: 98 % | HEIGHT: 66 IN | HEART RATE: 92 BPM | SYSTOLIC BLOOD PRESSURE: 118 MMHG

## 2020-01-13 DIAGNOSIS — J32.0 MAXILLARY SINUSITIS, UNSPECIFIED CHRONICITY: ICD-10-CM

## 2020-01-13 DIAGNOSIS — F41.9 ANXIETY: ICD-10-CM

## 2020-01-13 DIAGNOSIS — L30.9 ECZEMA, UNSPECIFIED TYPE: ICD-10-CM

## 2020-01-13 DIAGNOSIS — Z91.09 ENVIRONMENTAL ALLERGIES: Primary | ICD-10-CM

## 2020-01-13 DIAGNOSIS — M79.675 PAIN OF TOE OF LEFT FOOT: ICD-10-CM

## 2020-01-13 RX ORDER — SERTRALINE HYDROCHLORIDE 50 MG/1
75 TABLET, FILM COATED ORAL DAILY
Qty: 135 TAB | Refills: 1 | Status: SHIPPED | OUTPATIENT
Start: 2020-01-13 | End: 2020-07-15

## 2020-01-13 RX ORDER — ERYTHROMYCIN 5 MG/G
OINTMENT OPHTHALMIC
Status: CANCELLED | OUTPATIENT
Start: 2020-01-13

## 2020-01-13 NOTE — PROGRESS NOTES
Bill Nelson is a 42 y.o. female who presents today for Medication Evaluation; Anxiety; and Skin Problem  . She has a history of   Patient Active Problem List   Diagnosis Code    Anxiety F41.9    Pain of toe of left foot M79.675   . Today patient is here for f/u. Allergies: Patient was having worsening allergies and sinus congestion. We started her on Singulair to help her with her allergy type symptoms. Since she notes that her symptoms have persisted. Notes continued congestion and mucus production. Has stopped Claritin. Suggest seeing allergist.  I do suggest that she takes her antihistamine along with the Singulair and the nasal steroid in the meantime. L great toe pain. Notes that overall this is better. No help with meloxicam.  She did get an insert which seems to be helping. Recurrent inflammation below her eyes and surrounding her eyelashes. ? Allergy to makeup. Hydrocortisone did seem to help. I discussed my concern over steroid use near the eyes. We will try to prescribe her Eucrisa. Anxiety: Her mental health is been very stable. She would like to start decreasing her dose of SSRI. I agree. Will prescribe her 75 mg and have her slowly reduced back to 50 mg. ROS  Review of Systems   Constitutional: Negative for chills, fever and weight loss. HENT: Positive for congestion and sinus pain. Negative for nosebleeds and sore throat. Eyes: Negative for blurred vision, double vision and photophobia. Respiratory: Negative for cough, hemoptysis, shortness of breath and stridor. Cardiovascular: Negative for chest pain, palpitations and leg swelling. Gastrointestinal: Negative for abdominal pain, constipation, diarrhea, heartburn, nausea and vomiting. Genitourinary: Negative for dysuria, frequency and urgency. Musculoskeletal: Positive for joint pain. Negative for myalgias. Skin: Positive for rash. Neurological: Negative. Negative for headaches. Endo/Heme/Allergies: Does not bruise/bleed easily. Psychiatric/Behavioral: Negative for memory loss and suicidal ideas. Visit Vitals  /72 (BP 1 Location: Left arm, BP Patient Position: Sitting)   Pulse 92   Temp 98.2 °F (36.8 °C) (Oral)   Resp 16   Ht 5' 6\" (1.676 m)   Wt 202 lb (91.6 kg)   SpO2 98%   BMI 32.60 kg/m²       Physical Exam  Constitutional:       Appearance: She is well-developed. HENT:      Head: Normocephalic and atraumatic. Ears:      Comments: Fluid behind both tympanic membranes. Eyes:      Comments: Small amount of inflammation in eyelids below eyes. No obvious signs of cellulitis. Cardiovascular:      Rate and Rhythm: Normal rate and regular rhythm. Heart sounds: No murmur. Pulmonary:      Effort: Pulmonary effort is normal. No respiratory distress. Skin:     General: Skin is warm and dry. Neurological:      Mental Status: She is alert and oriented to person, place, and time. Psychiatric:         Behavior: Behavior normal.           Current Outpatient Medications   Medication Sig    meloxicam (MOBIC) 15 mg tablet TAKE 1 TABLET BY MOUTH EVERY DAY AS NEEDED FOR PAIN    norgestimate-ethinyl estradiol (MONONESSA, 28,) 0.25-35 mg-mcg tab Take 1 Tab by mouth daily.  sertraline (ZOLOFT) 100 mg tablet TAKE 1 TABLET BY MOUTH  DAILY    fluticasone (FLONASE) 50 mcg/actuation nasal spray 2 Sprays by Both Nostrils route daily.  montelukast (SINGULAIR) 10 mg tablet TAKE 1 TABLET BY MOUTH EVERY DAY    loratadine-pseudoephedrine (CLARITIN-D 24 HOUR)  mg per tablet Take 1 Tab by mouth daily. No current facility-administered medications for this visit.          Past Medical History:   Diagnosis Date    Anxiety     High cholesterol     HPV exposure     Routine Papanicolaou smear 8/26/16 neg HPV neg      Past Surgical History:   Procedure Laterality Date    HX GYN  12/2013    [facility]    HX WISDOM TEETH EXTRACTION        Social History     Tobacco Use    Smoking status: Former Smoker    Smokeless tobacco: Never Used   Substance Use Topics    Alcohol use: Yes      Family History   Problem Relation Age of Onset    High Cholesterol Father     Hypertension Father     Arthritis-rheumatoid Maternal Grandfather     No Known Problems Mother         No Known Allergies     Assessment/Plan  Diagnoses and all orders for this visit:    1. Environmental allergies-suggest taking both Singulair and antihistamine as well as nasal steroid until seeing allergist  -     REFERRAL TO ALLERGY    2. Maxillary sinusitis, unspecified chronicity  -     REFERRAL TO ALLERGY    3. Anxiety-improved overall. She will taper back down to 50 mg  -     sertraline (ZOLOFT) 50 mg tablet; Take 1.5 Tabs by mouth daily. TAKE 1 TABLET BY MOUTH  DAILY    4. Eczema, unspecified type-prefer for her to use this ointment around her eyes until she can see allergist  -     crisaborole (EUCRISA) 2 % oint; Apply a thin film to affected area(s) 2 times daily. 5. Pain of toe of left foot-working with orthopedist.            Peyton Chavez MD  1/13/2020    This note was created with the help of speech recognition software Jeremi Melgoza) and may contain some 'sound alike' errors.

## 2020-01-21 ENCOUNTER — DOCUMENTATION ONLY (OUTPATIENT)
Dept: INTERNAL MEDICINE CLINIC | Age: 40
End: 2020-01-21

## 2020-06-29 RX ORDER — MONTELUKAST SODIUM 10 MG/1
TABLET ORAL
Qty: 90 TAB | Refills: 1 | Status: SHIPPED | OUTPATIENT
Start: 2020-06-29 | End: 2020-12-20

## 2020-07-15 DIAGNOSIS — F41.9 ANXIETY: ICD-10-CM

## 2020-07-15 RX ORDER — SERTRALINE HYDROCHLORIDE 50 MG/1
TABLET, FILM COATED ORAL
Qty: 135 TAB | Refills: 1 | Status: SHIPPED | OUTPATIENT
Start: 2020-07-15 | End: 2020-07-17

## 2020-07-17 DIAGNOSIS — F41.9 ANXIETY: ICD-10-CM

## 2020-07-17 RX ORDER — SERTRALINE HYDROCHLORIDE 50 MG/1
TABLET, FILM COATED ORAL
Qty: 90 TAB | Refills: 1
Start: 2020-07-17 | End: 2021-05-14

## 2020-10-27 ENCOUNTER — TELEPHONE (OUTPATIENT)
Dept: OBGYN CLINIC | Age: 40
End: 2020-10-27

## 2020-10-27 RX ORDER — NORGESTIMATE AND ETHINYL ESTRADIOL 0.25-0.035
1 KIT ORAL DAILY
Qty: 1 PACKAGE | Refills: 0 | Status: SHIPPED | OUTPATIENT
Start: 2020-10-27 | End: 2020-11-09 | Stop reason: SDUPTHER

## 2020-10-27 NOTE — TELEPHONE ENCOUNTER
Pt LM on  requesting refill on Sprintec. I spoke with pt and got her AE scheduled for 11/9/20 at 1:30.   One pack of sprintec sent to pharm

## 2020-11-05 NOTE — PATIENT INSTRUCTIONS
Well Visit, Ages 25 to 48: Care Instructions  Your Care Instructions     Physical exams can help you stay healthy. Your doctor has checked your overall health and may have suggested ways to take good care of yourself. He or she also may have recommended tests. At home, you can help prevent illness with healthy eating, regular exercise, and other steps. Follow-up care is a key part of your treatment and safety. Be sure to make and go to all appointments, and call your doctor if you are having problems. It's also a good idea to know your test results and keep a list of the medicines you take. How can you care for yourself at home? · Reach and stay at a healthy weight. This will lower your risk for many problems, such as obesity, diabetes, heart disease, and high blood pressure. · Get at least 30 minutes of physical activity on most days of the week. Walking is a good choice. You also may want to do other activities, such as running, swimming, cycling, or playing tennis or team sports. Discuss any changes in your exercise program with your doctor. · Do not smoke or allow others to smoke around you. If you need help quitting, talk to your doctor about stop-smoking programs and medicines. These can increase your chances of quitting for good. · Talk to your doctor about whether you have any risk factors for sexually transmitted infections (STIs). Having one sex partner (who does not have STIs and does not have sex with anyone else) is a good way to avoid these infections. · Use birth control if you do not want to have children at this time. Talk with your doctor about the choices available and what might be best for you. · Protect your skin from too much sun. When you're outdoors from 10 a.m. to 4 p.m., stay in the shade or cover up with clothing and a hat with a wide brim. Wear sunglasses that block UV rays. Even when it's cloudy, put broad-spectrum sunscreen (SPF 30 or higher) on any exposed skin.   · See a dentist one or two times a year for checkups and to have your teeth cleaned. · Wear a seat belt in the car. Follow your doctor's advice about when to have certain tests. These tests can spot problems early. For everyone  · Cholesterol. Have the fat (cholesterol) in your blood tested after age [address]. Your doctor will tell you how often to have this done based on your age, family history, or other things that can increase your risk for heart disease. · Blood pressure. Have your blood pressure checked during a routine doctor visit. Your doctor will tell you how often to check your blood pressure based on your age, your blood pressure results, and other factors. · Vision. Talk with your doctor about how often to have a glaucoma test.  · Diabetes. Ask your doctor whether you should have tests for diabetes. · Colon cancer. Your risk for colorectal cancer gets higher as you get older. Some experts say that adults should start regular screening at age 48 and stop at age 76. Others say to start before age 48 or continue after age 76. Talk with your doctor about your risk and when to start and stop screening. For women  · Breast exam and mammogram. Talk to your doctor about when you should have a clinical breast exam and a mammogram. Medical experts differ on whether and how often women under 50 should have these tests. Your doctor can help you decide what is right for you. · Cervical cancer screening test and pelvic exam. Begin with a Pap test at age 24. The test often is part of a pelvic exam. Starting at age 27, you may choose to have a Pap test, an HPV test, or both tests at the same time (called co-testing). Talk with your doctor about how often to have testing. · Tests for sexually transmitted infections (STIs). Ask whether you should have tests for STIs. You may be at risk if you have sex with more than one person, especially if your partners do not wear condoms.   For men  · Tests for sexually transmitted infections (STIs). Ask whether you should have tests for STIs. You may be at risk if you have sex with more than one person, especially if you do not wear a condom. · Testicular cancer exam. Ask your doctor whether you should check your testicles regularly. · Prostate exam. Talk to your doctor about whether you should have a blood test (called a PSA test) for prostate cancer. Experts differ on whether and when men should have this test. Some experts suggest it if you are older than 39 and are -American or have a father or brother who got prostate cancer when he was younger than 72. When should you call for help? Watch closely for changes in your health, and be sure to contact your doctor if you have any problems or symptoms that concern you. Where can you learn more? Go to http://www.sue.com/  Enter P072 in the search box to learn more about \"Well Visit, Ages 25 to 48: Care Instructions. \"  Current as of: May 27, 2020               Content Version: 12.6  © 2006-2020 [employer]. Care instructions adapted under license by [employer] (which disclaims liability or warranty for this information). If you have questions about a medical condition or this instruction, always ask your healthcare professional. [place] [address] any warranty or liability for your use of this information.

## 2020-11-05 NOTE — PROGRESS NOTES
Annual exam ages 21-44    Curvin Lefort is a ,  47 y.o. female   Patient's last menstrual period was 10/27/2020 (approximate). She presents for her annual checkup. She is having no significant problems. With regard to the Gardasil vaccine, she has not received it yet. Menstrual status:    Her periods are normal in flow. She is using three to ten pads or tampons per day, usually regular with a 26-32 day interval with 3-7 day duration. She does not have dysmenorrhea. She reports no premenstrual symptoms. Contraception:    The current method of family planning is OCP. Sexual history:    She  reports being sexually active and has had partner(s) who are Male. She reports using the following method of birth control/protection: Pill. Medical conditions:    Since her last annual GYN exam about one year ago, she has not the following changes in her health history: none. Surgical history confirmed with patient. has a past surgical history that includes hx gyn (2013) and hx wisdom teeth extraction. Pap and Mammogram History:    Her most recent Pap smear was normal, obtained 1 year(s) ago. The patient has never had a mammogram.    Breast Cancer History/Substance Abuse: negative    Past Medical History:   Diagnosis Date    Anxiety     High cholesterol     HPV exposure     Routine Papanicolaou smear 16 neg HPV neg     Past Surgical History:   Procedure Laterality Date    HX GYN  2013    [facility]    HX WISDOM TEETH EXTRACTION         Current Outpatient Medications   Medication Sig Dispense Refill    azelastine (ASTEPRO) 0.15 % (205.5 mcg) two (2) times a day.  norgestimate-ethinyl estradioL (Mononessa, 28,) 0.25-35 mg-mcg tab Take 1 Tab by mouth daily.  1 Package 0    sertraline (ZOLOFT) 50 mg tablet TAKE 1 TABLET BY MOUTH DAILY 90 Tab 1    montelukast (SINGULAIR) 10 mg tablet TAKE 1 TABLET BY MOUTH EVERY DAY 90 Tab 1    loratadine-pseudoephedrine (CLARITIN-D 24 HOUR)  mg per tablet Take 1 Tab by mouth daily.  fluticasone (FLONASE) 50 mcg/actuation nasal spray 2 Sprays by Both Nostrils route daily.  crisaborole (EUCRISA) 2 % oint Apply a thin film to affected area(s) 2 times daily. (Patient not taking: Reported on 11/9/2020) 60 g 1     Allergies: Patient has no known allergies. Tobacco History:  reports that she has quit smoking. She has never used smokeless tobacco.  Alcohol Abuse:  reports current alcohol use. Drug Abuse:  reports no history of drug use.     Family Medical/Cancer History:   Family History   Problem Relation Age of Onset    High Cholesterol Father     Hypertension Father     Arthritis-rheumatoid Maternal Grandfather     No Known Problems Mother         Review of Systems - History obtained from the patient  Constitutional: negative for weight loss, fever, night sweats  HEENT: negative for hearing loss, earache, congestion, snoring, sorethroat  CV: negative for chest pain, palpitations, edema  Resp: negative for cough, shortness of breath, wheezing  GI: negative for change in bowel habits, abdominal pain, black or bloody stools  : negative for frequency, dysuria, hematuria, vaginal discharge  MSK: negative for back pain, joint pain, muscle pain  Breast: negative for breast lumps, nipple discharge, galactorrhea  Skin :negative for itching, rash, hives  Neuro: negative for dizziness, headache, confusion, weakness  Psych: negative for anxiety, depression, change in mood  Heme/lymph: negative for bleeding, bruising, pallor    Physical Exam    Visit Vitals  /62   Ht 5' 6\" (1.676 m)   Wt 200 lb (90.7 kg)   LMP 10/27/2020 (Approximate)   BMI 32.28 kg/m²       Constitutional  · Appearance: well-nourished, well developed, alert, in no acute distress    HENT  · Head and Face: appears normal    Neck  · Inspection/Palpation: normal appearance, no masses or tenderness  · Lymph Nodes: no lymphadenopathy present  · Thyroid: gland size normal, nontender, no nodules or masses present on palpation    Chest  · Respiratory Effort: breathing unlabored  · Auscultation: normal breath sounds    Cardiovascular  · Heart:  · Auscultation: regular rate and rhythm without murmur    Breasts  · Inspection of Breasts: breasts symmetrical, no skin changes, no discharge present, nipple appearance normal, no skin retraction present  · Palpation of Breasts and Axillae: no masses present on palpation, no breast tenderness  · Axillary Lymph Nodes: no lymphadenopathy present    Gastrointestinal  · Abdominal Examination: abdomen non-tender to palpation, normal bowel sounds, no masses present  · Liver and spleen: no hepatomegaly present, spleen not palpable  · Hernias: no hernias identified    Genitourinary  · External Genitalia: normal appearance for age, no discharge present, no tenderness present, no inflammatory lesions present, no masses present, no atrophy present  · Vagina: normal vaginal vault without central or paravaginal defects, no discharge present, no inflammatory lesions present, no masses present  · Bladder: non-tender to palpation  · Urethra: appears normal  · Cervix: normal   · Uterus: normal size, shape and consistency  · Adnexa: no adnexal tenderness present, no adnexal masses present  · Perineum: perineum within normal limits, no evidence of trauma, no rashes or skin lesions present  · Anus: anus within normal limits, no hemorrhoids present  · Inguinal Lymph Nodes: no lymphadenopathy present    Skin  · General Inspection: no rash, no lesions identified    Neurologic/Psychiatric  · Mental Status:  · Orientation: grossly oriented to person, place and time  · Mood and Affect: mood normal, affect appropriate    . Assessment:  Routine gynecologic examination  Her current medical status is satisfactory with no evidence of significant gynecologic issues.     Plan:  Counseled re: diet, exercise, healthy lifestyle  Return for yearly wellness visits  Gardisil counseling provided  Pt counseled regarding co-testing for high risk HPV with pap  Rec screening mammo at either 37 or 42

## 2020-11-09 ENCOUNTER — OFFICE VISIT (OUTPATIENT)
Dept: OBGYN CLINIC | Age: 40
End: 2020-11-09
Payer: COMMERCIAL

## 2020-11-09 VITALS
WEIGHT: 200 LBS | DIASTOLIC BLOOD PRESSURE: 62 MMHG | HEIGHT: 66 IN | SYSTOLIC BLOOD PRESSURE: 108 MMHG | BODY MASS INDEX: 32.14 KG/M2

## 2020-11-09 DIAGNOSIS — Z01.419 ENCOUNTER FOR ANNUAL ROUTINE GYNECOLOGICAL EXAMINATION: Primary | ICD-10-CM

## 2020-11-09 PROCEDURE — 99395 PREV VISIT EST AGE 18-39: CPT | Performed by: OBSTETRICS & GYNECOLOGY

## 2020-11-09 RX ORDER — AZELASTINE HCL 205.5 UG/1
SPRAY NASAL 2 TIMES DAILY
COMMUNITY

## 2020-11-09 RX ORDER — NORGESTIMATE AND ETHINYL ESTRADIOL 0.25-0.035
1 KIT ORAL DAILY
Qty: 3 PACKAGE | Refills: 4 | Status: SHIPPED | OUTPATIENT
Start: 2020-11-09 | End: 2021-11-23 | Stop reason: SDUPTHER

## 2020-12-20 RX ORDER — MONTELUKAST SODIUM 10 MG/1
TABLET ORAL
Qty: 90 TAB | Refills: 1 | Status: SHIPPED | OUTPATIENT
Start: 2020-12-20 | End: 2021-06-16

## 2021-05-14 DIAGNOSIS — F41.9 ANXIETY: ICD-10-CM

## 2021-05-14 RX ORDER — SERTRALINE HYDROCHLORIDE 50 MG/1
TABLET, FILM COATED ORAL
Qty: 135 TAB | Refills: 1 | Status: SHIPPED | OUTPATIENT
Start: 2021-05-14 | End: 2021-11-15

## 2021-06-16 RX ORDER — MONTELUKAST SODIUM 10 MG/1
TABLET ORAL
Qty: 90 TABLET | Refills: 1 | Status: SHIPPED | OUTPATIENT
Start: 2021-06-16 | End: 2021-11-30

## 2021-11-15 DIAGNOSIS — F41.9 ANXIETY: ICD-10-CM

## 2021-11-15 RX ORDER — SERTRALINE HYDROCHLORIDE 50 MG/1
TABLET, FILM COATED ORAL
Qty: 135 TABLET | Refills: 1 | Status: SHIPPED | OUTPATIENT
Start: 2021-11-15 | End: 2022-05-20

## 2021-11-18 NOTE — PROGRESS NOTES
Annual exam ages 21-44    Jeffry Collins is a ,  46 y.o. female   Patient's last menstrual period was 10/26/2021 (exact date). She presents for her annual checkup. She is having no significant problems. With regard to the Gardasil vaccine, she has not received it yet. Menstrual status:    Her periods are normal in flow. She is using one to three pads or tampons per day, usually regular with a 26-32 day interval with 3-7 day duration. She does not have dysmenorrhea. She reports no premenstrual symptoms. Contraception:    The current method of family planning is OCP. Sexual history:    She  reports being sexually active and has had partner(s) who are male. She reports using the following method of birth control/protection: Pill. Medical conditions:    Since her last annual GYN exam about one year ago, she has not the following changes in her health history: none. Surgical history confirmed with patient. has a past surgical history that includes hx gyn (2013) and hx wisdom teeth extraction. Pap and Mammogram History:    Her most recent Pap smear was normal, obtained 2 year(s) ago. The patient has never had a mammogram.    Breast Cancer History/Substance Abuse: negative    Past Medical History:   Diagnosis Date    Anxiety     High cholesterol     HPV exposure     Routine Papanicolaou smear 16 neg HPV neg     Past Surgical History:   Procedure Laterality Date    HX GYN  2013    [facility]ryo    HX WISDOM TEETH EXTRACTION         Current Outpatient Medications   Medication Sig Dispense Refill    sertraline (ZOLOFT) 50 mg tablet TAKE 1 AND 1/2 TABLETS BY MOUTH DAILY 135 Tablet 1    montelukast (SINGULAIR) 10 mg tablet TAKE 1 TABLET BY MOUTH EVERY DAY 90 Tablet 1    azelastine (ASTEPRO) 0.15 % (205.5 mcg) two (2) times a day.  norgestimate-ethinyl estradioL (Mononessa, 28,) 0.25-35 mg-mcg tab Take 1 Tab by mouth daily.  3 Package 4    loratadine-pseudoephedrine (CLARITIN-D 24 HOUR)  mg per tablet Take 1 Tab by mouth daily.  fluticasone (FLONASE) 50 mcg/actuation nasal spray 2 Sprays by Both Nostrils route daily.  crisaborole (EUCRISA) 2 % oint Apply a thin film to affected area(s) 2 times daily. (Patient not taking: Reported on 11/9/2020) 60 g 1     Allergies: Patient has no known allergies. Tobacco History:  reports that she has quit smoking. She has never used smokeless tobacco.  Alcohol Abuse:  reports current alcohol use. Drug Abuse:  reports no history of drug use.     Family Medical/Cancer History:   Family History   Problem Relation Age of Onset    High Cholesterol Father     Hypertension Father     Arthritis-rheumatoid Maternal Grandfather     No Known Problems Mother         Review of Systems - History obtained from the patient  Constitutional: negative for weight loss, fever, night sweats  HEENT: negative for hearing loss, earache, congestion, snoring, sorethroat  CV: negative for chest pain, palpitations, edema  Resp: negative for cough, shortness of breath, wheezing  GI: negative for change in bowel habits, abdominal pain, black or bloody stools  : negative for frequency, dysuria, hematuria, vaginal discharge  MSK: negative for back pain, joint pain, muscle pain  Breast: negative for breast lumps, nipple discharge, galactorrhea  Skin :negative for itching, rash, hives  Neuro: negative for dizziness, headache, confusion, weakness  Psych: negative for anxiety, depression, change in mood  Heme/lymph: negative for bleeding, bruising, pallor    Physical Exam    Visit Vitals  /82   Wt 198 lb (89.8 kg)   LMP 10/26/2021 (Exact Date)   BMI 31.96 kg/m²       Constitutional  · Appearance: well-nourished, well developed, alert, in no acute distress    HENT  · Head and Face: appears normal    Neck  · Inspection/Palpation: normal appearance, no masses or tenderness  · Lymph Nodes: no lymphadenopathy present  · Thyroid: gland size normal, nontender, no nodules or masses present on palpation    Chest  · Respiratory Effort: breathing unlabored  · Auscultation: normal breath sounds    Cardiovascular  · Heart:  · Auscultation: regular rate and rhythm without murmur    Breasts  · Inspection of Breasts: breasts symmetrical, no skin changes, no discharge present, nipple appearance normal, no skin retraction present  · Palpation of Breasts and Axillae: no masses present on palpation, no breast tenderness  · Axillary Lymph Nodes: no lymphadenopathy present    Gastrointestinal  · Abdominal Examination: abdomen non-tender to palpation, normal bowel sounds, no masses present  · Liver and spleen: no hepatomegaly present, spleen not palpable  · Hernias: no hernias identified    Genitourinary  · External Genitalia: normal appearance for age, no discharge present, no tenderness present, no inflammatory lesions present, no masses present, no atrophy present  · Vagina: normal vaginal vault without central or paravaginal defects, no discharge present, no inflammatory lesions present, no masses present  · Bladder: non-tender to palpation  · Urethra: appears normal  · Cervix: normal   · Uterus: normal size, shape and consistency  · Adnexa: no adnexal tenderness present, no adnexal masses present  · Perineum: perineum within normal limits, no evidence of trauma, no rashes or skin lesions present  · Anus: anus within normal limits, no hemorrhoids present  · Inguinal Lymph Nodes: no lymphadenopathy present    Skin  · General Inspection: no rash, no lesions identified    Neurologic/Psychiatric  · Mental Status:  · Orientation: grossly oriented to person, place and time  · Mood and Affect: mood normal, affect appropriate    . Assessment:  Routine gynecologic examination  Her current medical status is satisfactory with no evidence of significant gynecologic issues.     Plan:  Counseled re: diet, exercise, healthy lifestyle  Return for yearly wellness visits  Gardisil counseling provided  Pt counseled regarding co-testing for high risk HPV with pap  Rec screening mammo at either 37 or 42

## 2021-11-23 ENCOUNTER — OFFICE VISIT (OUTPATIENT)
Dept: OBGYN CLINIC | Age: 41
End: 2021-11-23
Payer: COMMERCIAL

## 2021-11-23 VITALS — SYSTOLIC BLOOD PRESSURE: 129 MMHG | BODY MASS INDEX: 31.96 KG/M2 | DIASTOLIC BLOOD PRESSURE: 82 MMHG | WEIGHT: 198 LBS

## 2021-11-23 DIAGNOSIS — Z01.419 ENCOUNTER FOR ANNUAL ROUTINE GYNECOLOGICAL EXAMINATION: Primary | ICD-10-CM

## 2021-11-23 PROCEDURE — 99395 PREV VISIT EST AGE 18-39: CPT | Performed by: OBSTETRICS & GYNECOLOGY

## 2021-11-23 RX ORDER — NORGESTIMATE AND ETHINYL ESTRADIOL 0.25-0.035
1 KIT ORAL DAILY
Qty: 3 DOSE PACK | Refills: 4 | Status: SHIPPED | OUTPATIENT
Start: 2021-11-23

## 2021-11-23 NOTE — PATIENT INSTRUCTIONS
Well Visit, Ages 25 to 48: Care Instructions  Overview     Well visits can help you stay healthy. Your doctor has checked your overall health and may have suggested ways to take good care of yourself. Your doctor also may have recommended tests. At home, you can help prevent illness with healthy eating, regular exercise, and other steps. Follow-up care is a key part of your treatment and safety. Be sure to make and go to all appointments, and call your doctor if you are having problems. It's also a good idea to know your test results and keep a list of the medicines you take. How can you care for yourself at home? · Get screening tests that you and your doctor decide on. Screening helps find diseases before any symptoms appear. · Eat healthy foods. Choose fruits, vegetables, whole grains, protein, and low-fat dairy foods. Limit fat, especially saturated fat. Reduce salt in your diet. · Limit alcohol. If you are a man, have no more than 2 drinks a day or 14 drinks a week. If you are a woman, have no more than 1 drink a day or 7 drinks a week. · Get at least 30 minutes of physical activity on most days of the week. Walking is a good choice. You also may want to do other activities, such as running, swimming, cycling, or playing tennis or team sports. Discuss any changes in your exercise program with your doctor. · Reach and stay at a healthy weight. This will lower your risk for many problems, such as obesity, diabetes, heart disease, and high blood pressure. · Do not smoke or allow others to smoke around you. If you need help quitting, talk to your doctor about stop-smoking programs and medicines. These can increase your chances of quitting for good. · Care for your mental health. It is easy to get weighed down by worry and stress. Learn strategies to manage stress, like deep breathing and mindfulness, and stay connected with your family and community.  If you find you often feel sad or hopeless, talk with your doctor. Treatment can help. · Talk to your doctor about whether you have any risk factors for sexually transmitted infections (STIs). You can help prevent STIs if you wait to have sex with a new partner (or partners) until you've each been tested for STIs. It also helps if you use condoms (male or female condoms) and if you limit your sex partners to one person who only has sex with you. Vaccines are available for some STIs, such as HPV. · Use birth control if it's important to you to prevent pregnancy. Talk with your doctor about the choices available and what might be best for you. · If you think you may have a problem with alcohol or drug use, talk to your doctor. This includes prescription medicines (such as amphetamines and opioids) and illegal drugs (such as cocaine and methamphetamine). Your doctor can help you figure out what type of treatment is best for you. · Protect your skin from too much sun. When you're outdoors from 10 a.m. to 4 p.m., stay in the shade or cover up with clothing and a hat with a wide brim. Wear sunglasses that block UV rays. Even when it's cloudy, put broad-spectrum sunscreen (SPF 30 or higher) on any exposed skin. · See a dentist one or two times a year for checkups and to have your teeth cleaned. · Wear a seat belt in the car. When should you call for help? Watch closely for changes in your health, and be sure to contact your doctor if you have any problems or symptoms that concern you. Where can you learn more? Go to http://www.[employer].com/  Enter P072 in the search box to learn more about \"Well Visit, Ages 25 to 48: Care Instructions. \"  Current as of: February 11, 2021               Content Version: 13.0  © 0911-8832 [employer]. Care instructions adapted under license by [employer] (which disclaims liability or warranty for this information).  If you have questions about a medical condition or this instruction, always ask your healthcare professional. [place] [address] any warranty or liability for your use of this information.

## 2021-11-30 RX ORDER — MONTELUKAST SODIUM 10 MG/1
TABLET ORAL
Qty: 90 TABLET | Refills: 1 | Status: SHIPPED | OUTPATIENT
Start: 2021-11-30 | End: 2022-05-24

## 2022-03-18 PROBLEM — M79.675 PAIN OF TOE OF LEFT FOOT: Status: ACTIVE | Noted: 2017-03-07

## 2022-03-19 PROBLEM — F41.9 ANXIETY: Status: ACTIVE | Noted: 2017-03-07

## 2022-05-24 RX ORDER — MONTELUKAST SODIUM 10 MG/1
TABLET ORAL
Qty: 90 TABLET | Refills: 1 | Status: SHIPPED | OUTPATIENT
Start: 2022-05-24

## 2022-08-23 ENCOUNTER — OFFICE VISIT (OUTPATIENT)
Dept: INTERNAL MEDICINE CLINIC | Age: 42
End: 2022-08-23
Payer: COMMERCIAL

## 2022-08-23 VITALS
RESPIRATION RATE: 16 BRPM | DIASTOLIC BLOOD PRESSURE: 68 MMHG | OXYGEN SATURATION: 98 % | HEIGHT: 66 IN | WEIGHT: 193 LBS | SYSTOLIC BLOOD PRESSURE: 103 MMHG | TEMPERATURE: 98.1 F | BODY MASS INDEX: 31.02 KG/M2 | HEART RATE: 66 BPM

## 2022-08-23 DIAGNOSIS — M25.562 CHRONIC PAIN OF BOTH KNEES: ICD-10-CM

## 2022-08-23 DIAGNOSIS — Z13.220 SCREENING CHOLESTEROL LEVEL: ICD-10-CM

## 2022-08-23 DIAGNOSIS — M79.675 PAIN OF TOE OF LEFT FOOT: ICD-10-CM

## 2022-08-23 DIAGNOSIS — M25.561 CHRONIC PAIN OF BOTH KNEES: ICD-10-CM

## 2022-08-23 DIAGNOSIS — F41.9 ANXIETY: ICD-10-CM

## 2022-08-23 DIAGNOSIS — G89.29 CHRONIC PAIN OF BOTH KNEES: ICD-10-CM

## 2022-08-23 DIAGNOSIS — M25.559 HIP PAIN: Primary | ICD-10-CM

## 2022-08-23 PROCEDURE — 99214 OFFICE O/P EST MOD 30 MIN: CPT | Performed by: INTERNAL MEDICINE

## 2022-08-23 RX ORDER — SERTRALINE HYDROCHLORIDE 50 MG/1
50 TABLET, FILM COATED ORAL DAILY
Qty: 90 TABLET | Refills: 3
Start: 2022-08-23 | End: 2022-09-29

## 2022-08-23 NOTE — PROGRESS NOTES
Hanh Magallanes  Identified pt with two pt identifiers(name and ). Chief Complaint   Patient presents with    Medication Refill     RM18// pt presenting today for routine medication refills; with no concerns       1. Have you been to the ER, urgent care clinic since your last visit? Hospitalized since your last visit? NO    2. Have you seen or consulted any other health care providers outside of the [address] [place] [address] since your last visit? Include any pap smears or colon screening. NO      Provider notified of reason for visit, vitals and flowsheets obtained on patients. Patient received paperwork for advance directive during previous visit but has not completed at this time     Reviewed record In preparation for visit, huddled with provider and have obtained necessary documentation      Health Maintenance Due   Topic    COVID-19 Vaccine (1)    Depression Screen     Lipid Screen        Wt Readings from Last 3 Encounters:   22 193 lb (87.5 kg)   21 198 lb (89.8 kg)   20 200 lb (90.7 kg)     Temp Readings from Last 3 Encounters:   22 98.1 °F (36.7 °C) (Oral)   20 98.2 °F (36.8 °C) (Oral)   19 98.2 °F (36.8 °C) (Oral)     BP Readings from Last 3 Encounters:   22 103/68   21 129/82   20 108/62     Pulse Readings from Last 3 Encounters:   22 66   20 92   19 83     Vitals:    22 1300   BP: 103/68   Pulse: 66   Resp: 16   Temp: 98.1 °F (36.7 °C)   TempSrc: Oral   SpO2: 98%   Weight: 193 lb (87.5 kg)   Height: 5' 6\" (1.676 m)   PainSc:   0 - No pain   LMP: 2022         Learning Assessment:  :     No flowsheet data found.     Depression Screening:  :     3 most recent PHQ Screens 2022   PHQ Not Done -   Little interest or pleasure in doing things Not at all   Feeling down, depressed, irritable, or hopeless Not at all   Total Score PHQ 2 0       Fall Risk Assessment:  :     Fall Risk Assessment, last 12 mths 2020 Able to walk? Yes   Fall in past 12 months? No       Abuse Screening:  :     Abuse Screening Questionnaire 1/13/2020 8/22/2019 12/17/2018 9/19/2017   Do you ever feel afraid of your partner? N N N N   Are you in a relationship with someone who physically or mentally threatens you? N N N N   Is it safe for you to go home? Y Y Y Y       ADL Screening:  :     No flowsheet data found. Medication reconciliation up to date and corrected with patient at this time.

## 2022-08-23 NOTE — PROGRESS NOTES
Bharat Olivier is a 38 y.o. female who presents today for Medication Refill (RM18// pt presenting today for routine medication refills; with no concerns)  . She has a history of   Patient Active Problem List   Diagnosis Code    Anxiety F41.9    Pain of toe of left foot M79.675   . Today patient is here for follow up. Now working for [employer]. Working from home. Toe is still an issues. L foot. Having more hip and knee pain. Does have a significant family history of osteoarthritis of the knees and hips. She has been working on weight loss to help protect her knees and hips. She would like to see an orthopedist for initial evaluation. Anx/Depression: has been on an SSRI for some time. Reports that her mood has been stable. She has been down to 50 mg for some time. Some stress when her  was at rehab, but things are going well overall. She enjoys her new job. Seeing Dr. Mack Mansfield for OBGYN. Discussed MMG screening for Breast cancer. We discussed screening lipid panel. ROS  Review of Systems   Constitutional:  Negative for chills, fever and weight loss. HENT:  Negative for congestion and sore throat. Eyes:  Negative for blurred vision, double vision and photophobia. Respiratory:  Negative for cough and hemoptysis. Cardiovascular:  Negative for chest pain, palpitations and leg swelling. Gastrointestinal:  Negative for abdominal pain, constipation, diarrhea, heartburn, nausea and vomiting. Genitourinary:  Negative for dysuria, frequency and urgency. Musculoskeletal:  Positive for back pain, joint pain and myalgias. Skin:  Negative for rash. Neurological: Negative. Negative for dizziness, sensory change, speech change, focal weakness, weakness and headaches. Endo/Heme/Allergies:  Does not bruise/bleed easily. Psychiatric/Behavioral:  Positive for depression. Negative for memory loss and suicidal ideas. The patient is nervous/anxious.       Visit Vitals  /68 (BP 1 Location: Left upper arm, BP Patient Position: Sitting, BP Cuff Size: Large adult)   Pulse 66   Temp 98.1 °F (36.7 °C) (Oral)   Resp 16   Ht 5' 6\" (1.676 m)   Wt 193 lb (87.5 kg)   SpO2 98%   BMI 31.15 kg/m²       Physical Exam  Constitutional:       Appearance: She is well-developed. HENT:      Head: Normocephalic and atraumatic. Cardiovascular:      Rate and Rhythm: Normal rate and regular rhythm. Heart sounds: No murmur heard. Pulmonary:      Effort: Pulmonary effort is normal. No respiratory distress. Abdominal:      General: Abdomen is flat. Palpations: Abdomen is soft. Musculoskeletal:      Comments: No crepitus to bilateral knee. Good range of motion of bilateral hips. Skin:     General: Skin is warm and dry. Neurological:      Mental Status: She is alert and oriented to person, place, and time. Psychiatric:         Behavior: Behavior normal.         Current Outpatient Medications   Medication Sig    montelukast (SINGULAIR) 10 mg tablet TAKE 1 TABLET BY MOUTH EVERY DAY    sertraline (ZOLOFT) 50 mg tablet TAKE 1 AND 1/2 TABLETS DAILY BY MOUTH    norgestimate-ethinyl estradioL (Mononessa, 28,) 0.25-35 mg-mcg tab Take 1 Tablet by mouth daily. azelastine (ASTEPRO) 0.15 % (205.5 mcg) two (2) times a day. crisaborole (EUCRISA) 2 % oint Apply a thin film to affected area(s) 2 times daily. loratadine-pseudoephedrine (CLARITIN-D 24-hour)  mg per tablet Take 1 Tab by mouth daily. fluticasone propionate (FLONASE) 50 mcg/actuation nasal spray 2 Sprays by Both Nostrils route daily. No current facility-administered medications for this visit.         Past Medical History:   Diagnosis Date    Anxiety     Arthritis 04/2012    pain in a few places, toes and hands are the worst    High cholesterol     HPV exposure     Routine Papanicolaou smear 08/26/2016      Past Surgical History:   Procedure Laterality Date    HX CYST REMOVAL  06/2021    top of head    HX GYN  12/2013 Cryo    HX WISDOM TEETH EXTRACTION        Social History     Tobacco Use    Smoking status: Former     Packs/day: 0.25     Years: 3.00     Pack years: 0.75     Types: Cigarettes     Start date: 2005     Quit date: 2008     Years since quittin.6    Smokeless tobacco: Never    Tobacco comments:     casual smoker   Substance Use Topics    Alcohol use: Yes     Alcohol/week: 3.0 standard drinks     Types: 3 Standard drinks or equivalent per week      Family History   Problem Relation Age of Onset    High Cholesterol Father     Hypertension Father     Elevated Lipids Father     Heart Disease Father     Arthritis-rheumatoid Maternal Grandfather     OSTEOARTHRITIS Maternal Grandfather             No Known Problems Mother         No Known Allergies     Assessment/Plan  Diagnoses and all orders for this visit:    1. Hip pain- chronic. Suggest ortho evaluation.   -     REFERRAL TO ORTHOPEDICS    2. Chronic pain of both knees-  chronic. Suggest ortho evaluation.   -     REFERRAL TO ORTHOPEDICS    3. Screening cholesterol level- baseline.   -     METABOLIC PANEL, COMPREHENSIVE; Future  -     LIPID PANEL; Future  -     CBC WITH AUTOMATED DIFF; Future    4. Anxiety-mood doing very well with just 50 mg of Zoloft. Things are stable. -     sertraline (ZOLOFT) 50 mg tablet; Take 1 Tablet by mouth daily. 5. Pain of toe of left foot  -     REFERRAL TO Quiana Woods MD  2022    This note was created with the help of speech recognition software Selina De Souza) and may contain some 'sound alike' errors.

## 2022-08-24 LAB
ALBUMIN SERPL-MCNC: 3.7 G/DL (ref 3.5–5)
ALBUMIN/GLOB SERPL: 1 {RATIO} (ref 1.1–2.2)
ALP SERPL-CCNC: 66 U/L (ref 45–117)
ALT SERPL-CCNC: 15 U/L (ref 12–78)
ANION GAP SERPL CALC-SCNC: 5 MMOL/L (ref 5–15)
AST SERPL-CCNC: 10 U/L (ref 15–37)
BASOPHILS # BLD: 0 K/UL (ref 0–0.1)
BASOPHILS NFR BLD: 1 % (ref 0–1)
BILIRUB SERPL-MCNC: 0.2 MG/DL (ref 0.2–1)
BUN SERPL-MCNC: 8 MG/DL (ref 6–20)
BUN/CREAT SERPL: 9 (ref 12–20)
CALCIUM SERPL-MCNC: 9 MG/DL (ref 8.5–10.1)
CHLORIDE SERPL-SCNC: 107 MMOL/L (ref 97–108)
CHOLEST SERPL-MCNC: 329 MG/DL
CO2 SERPL-SCNC: 27 MMOL/L (ref 21–32)
CREAT SERPL-MCNC: 0.87 MG/DL (ref 0.55–1.02)
DIFFERENTIAL METHOD BLD: NORMAL
EOSINOPHIL # BLD: 0.1 K/UL (ref 0–0.4)
EOSINOPHIL NFR BLD: 1 % (ref 0–7)
ERYTHROCYTE [DISTWIDTH] IN BLOOD BY AUTOMATED COUNT: 12.2 % (ref 11.5–14.5)
GLOBULIN SER CALC-MCNC: 3.6 G/DL (ref 2–4)
GLUCOSE SERPL-MCNC: 81 MG/DL (ref 65–100)
HCT VFR BLD AUTO: 41.9 % (ref 35–47)
HDLC SERPL-MCNC: 64 MG/DL
HDLC SERPL: 5.1 {RATIO} (ref 0–5)
HGB BLD-MCNC: 13.8 G/DL (ref 11.5–16)
IMM GRANULOCYTES # BLD AUTO: 0 K/UL (ref 0–0.04)
IMM GRANULOCYTES NFR BLD AUTO: 0 % (ref 0–0.5)
LDLC SERPL CALC-MCNC: 207.8 MG/DL (ref 0–100)
LYMPHOCYTES # BLD: 2.4 K/UL (ref 0.8–3.5)
LYMPHOCYTES NFR BLD: 29 % (ref 12–49)
MCH RBC QN AUTO: 30.9 PG (ref 26–34)
MCHC RBC AUTO-ENTMCNC: 32.9 G/DL (ref 30–36.5)
MCV RBC AUTO: 93.9 FL (ref 80–99)
MONOCYTES # BLD: 0.4 K/UL (ref 0–1)
MONOCYTES NFR BLD: 5 % (ref 5–13)
NEUTS SEG # BLD: 5.2 K/UL (ref 1.8–8)
NEUTS SEG NFR BLD: 64 % (ref 32–75)
NRBC # BLD: 0 K/UL (ref 0–0.01)
NRBC BLD-RTO: 0 PER 100 WBC
PLATELET # BLD AUTO: 318 K/UL (ref 150–400)
PMV BLD AUTO: 10.2 FL (ref 8.9–12.9)
POTASSIUM SERPL-SCNC: 4.4 MMOL/L (ref 3.5–5.1)
PROT SERPL-MCNC: 7.3 G/DL (ref 6.4–8.2)
RBC # BLD AUTO: 4.46 M/UL (ref 3.8–5.2)
SODIUM SERPL-SCNC: 139 MMOL/L (ref 136–145)
TRIGL SERPL-MCNC: 286 MG/DL (ref ?–150)
VLDLC SERPL CALC-MCNC: 57.2 MG/DL
WBC # BLD AUTO: 8.2 K/UL (ref 3.6–11)

## 2022-09-29 DIAGNOSIS — F41.9 ANXIETY: ICD-10-CM

## 2022-09-29 RX ORDER — SERTRALINE HYDROCHLORIDE 50 MG/1
TABLET, FILM COATED ORAL
Qty: 135 TABLET | Refills: 1 | Status: SHIPPED | OUTPATIENT
Start: 2022-09-29

## 2022-11-22 RX ORDER — MONTELUKAST SODIUM 10 MG/1
TABLET ORAL
Qty: 90 TABLET | Refills: 1 | Status: SHIPPED | OUTPATIENT
Start: 2022-11-22

## 2022-11-29 ENCOUNTER — OFFICE VISIT (OUTPATIENT)
Dept: OBGYN CLINIC | Age: 42
End: 2022-11-29
Payer: COMMERCIAL

## 2022-11-29 VITALS — SYSTOLIC BLOOD PRESSURE: 126 MMHG | DIASTOLIC BLOOD PRESSURE: 84 MMHG | WEIGHT: 201.2 LBS | BODY MASS INDEX: 32.47 KG/M2

## 2022-11-29 DIAGNOSIS — Z01.419 ENCOUNTER FOR GYNECOLOGICAL EXAMINATION WITHOUT ABNORMAL FINDING: Primary | ICD-10-CM

## 2022-11-29 PROCEDURE — 99396 PREV VISIT EST AGE 40-64: CPT | Performed by: OBSTETRICS & GYNECOLOGY

## 2022-11-29 RX ORDER — NORGESTIMATE AND ETHINYL ESTRADIOL 0.25-0.035
1 KIT ORAL DAILY
Qty: 3 DOSE PACK | Refills: 4 | Status: SHIPPED | OUTPATIENT
Start: 2022-11-29

## 2022-11-29 NOTE — PROGRESS NOTES
Annual exam  Elier Giles is a [de-identified] ,  38 y.o. female   Patient's last menstrual period was 11/22/2022. She presents for her annual checkup. Working from home for [employer]. She is having no significant problems. Menstrual status:    Her periods are normal in flow and usually regular with a 26-32 day interval with 3-7 day duration. She does not have dysmenorrhea. Sexual history:    She  reports being sexually active and has had partner(s) who are male. She reports using the following methods of birth control/protection: Condom and Pill. Birth Control: OCP (estrogen/progesterone). Last Pap: normal obtained 3 year(s) ago. She does not have a history of JANEY 2, 3 or cervical cancer. Last Mammogram: has never had a mammogram.         Past Medical History:   Diagnosis Date    Anxiety     Arthritis 04/2012    pain in a few places, toes and hands are the worst    High cholesterol     HPV exposure     Routine Papanicolaou smear 08/26/2016     Past Surgical History:   Procedure Laterality Date    HX CYST REMOVAL  06/2021    top of head    HX GYN  12/2013    Cryo    HX WISDOM TEETH EXTRACTION         Current Outpatient Medications   Medication Sig Dispense Refill    montelukast (SINGULAIR) 10 mg tablet TAKE 1 TABLET BY MOUTH EVERY DAY 90 Tablet 1    sertraline (ZOLOFT) 50 mg tablet TAKE 1 AND 1/2 TABLETS BY MOUTH DAILY 135 Tablet 1    norgestimate-ethinyl estradioL (Mononessa, 28,) 0.25-35 mg-mcg tab Take 1 Tablet by mouth daily. 3 Dose Pack 4    crisaborole (EUCRISA) 2 % oint Apply a thin film to affected area(s) 2 times daily. 60 g 1    loratadine-pseudoephedrine (CLARITIN-D 24-hour)  mg per tablet Take 1 Tab by mouth daily. fluticasone propionate (FLONASE) 50 mcg/actuation nasal spray 2 Sprays by Both Nostrils route daily. azelastine (ASTEPRO) 0.15 % (205.5 mcg) two (2) times a day. Allergies: Patient has no known allergies.      Tobacco History:  reports that she quit smoking about 14 years ago. Her smoking use included cigarettes. She started smoking about 17 years ago. She has a 0.75 pack-year smoking history. She has never used smokeless tobacco.  Alcohol Abuse:  reports current alcohol use of about 3.0 standard drinks per week. Drug Abuse:  reports no history of drug use.     Family Medical/Cancer History:   Family History   Problem Relation Age of Onset    High Cholesterol Father     Hypertension Father     Elevated Lipids Father     Heart Disease Father     Arthritis-rheumatoid Maternal Grandfather     OSTEOARTHRITIS Maternal Grandfather             No Known Problems Mother         Review of Systems - History obtained from the patient  Constitutional: negative for weight loss, fever, night sweats  HEENT: negative for hearing loss, earache, congestion, snoring, sorethroat  CV: negative for chest pain, palpitations, edema  Resp: negative for cough, shortness of breath, wheezing  GI: negative for change in bowel habits, abdominal pain, black or bloody stools  : negative for frequency, dysuria, hematuria, vaginal discharge  MSK: negative for back pain, joint pain, muscle pain  Breast: negative for breast lumps, nipple discharge, galactorrhea  Skin :negative for itching, rash, hives  Neuro: negative for dizziness, headache, confusion, weakness  Psych: negative for anxiety, depression, change in mood  Heme/lymph: negative for bleeding, bruising, pallor    Physical Exam    Visit Vitals  /84   Wt 201 lb 3.2 oz (91.3 kg)   LMP 2022   BMI 32.47 kg/m²       Constitutional  Appearance: well-nourished, well developed, alert, in no acute distress    HENT  Head and Face: appears normal    Neck  Inspection/Palpation: normal appearance, no masses or tenderness  Lymph Nodes: no lymphadenopathy present  Thyroid: gland size normal, nontender, no nodules or masses present on palpation    Chest  Respiratory Effort: breathing unlabored  Auscultation: normal breath sounds    Cardiovascular  Heart: Auscultation: regular rate and rhythm without murmur    Breasts  Inspection of Breasts: breasts symmetrical, no skin changes, no discharge present, nipple appearance normal, no skin retraction present  Palpation of Breasts and Axillae: no masses present on palpation, no breast tenderness  Axillary Lymph Nodes: no lymphadenopathy present    Gastrointestinal  Abdominal Examination: abdomen non-tender to palpation, normal bowel sounds, no masses present  Liver and spleen: no hepatomegaly present, spleen not palpable  Hernias: no hernias identified    Genitourinary  External Genitalia: normal appearance for age, no discharge present, no tenderness present, no inflammatory lesions present, no masses present, no atrophy present  Vagina: normal vaginal vault without central or paravaginal defects, no discharge present, no inflammatory lesions present, no masses present  Bladder: non-tender to palpation  Urethra: appears normal  Cervix: normal   Uterus: normal size, shape and consistency  Adnexa: no adnexal tenderness present, no adnexal masses present  Perineum: perineum within normal limits, no evidence of trauma, no rashes or skin lesions present  Anus: anus within normal limits, no hemorrhoids present  Inguinal Lymph Nodes: no lymphadenopathy present    Skin  General Inspection: no rash, no lesions identified    Neurologic/Psychiatric  Mental Status:  Orientation: grossly oriented to person, place and time  Mood and Affect: mood normal, affect appropriate    Assessment:  Routine gynecologic examination  Her current medical status is satisfactory with no evidence of significant gynecologic issues. Needs mammo.     Plan:  Counseled re: diet, exercise, healthy lifestyle  Return for yearly wellness visits  Gardisil counseling provided  Pt counseled regarding co-testing for high risk HPV with pap  Rec screening mammo at either 37 or 42

## 2022-11-29 NOTE — PROGRESS NOTES
Kimberly Brewster is a 38 y.o. female returns for an annual exam     Chief Complaint   Patient presents with    Well Woman       Patient's last menstrual period was 11/22/2022. Her periods are normal in flow and usually regular with a 26-32 day interval with 3-7 day duration. She does not have dysmenorrhea. Problems: no significant problems  Birth Control: OCP (estrogen/progesterone). Last Pap: normal obtained 3 year(s) ago. She does not have a history of JANEY 2, 3 or cervical cancer. Last Mammogram: has never had a mammogram.        1. Have you been to the ER, urgent care clinic, or hospitalized since your last visit? No    2. Have you seen or consulted any other health care providers outside of the [address] [place] [address] since your last visit? No    Examination chaperoned by Melissa Villa LPN.

## 2022-12-02 LAB
CYTOLOGIST CVX/VAG CYTO: NORMAL
CYTOLOGY CVX/VAG DOC CYTO: NORMAL
CYTOLOGY CVX/VAG DOC THIN PREP: NORMAL
CYTOLOGY HISTORY:: NORMAL
DX ICD CODE: NORMAL
HPV GENOTYPE REFLEX: NORMAL
HPV I/H RISK 4 DNA CVX QL PROBE+SIG AMP: NEGATIVE
Lab: NORMAL
OTHER STN SPEC: NORMAL
STAT OF ADQ CVX/VAG CYTO-IMP: NORMAL

## 2023-05-23 RX ORDER — MONTELUKAST SODIUM 10 MG/1
TABLET ORAL
Qty: 90 TABLET | Refills: 1 | Status: SHIPPED | OUTPATIENT
Start: 2023-05-23

## 2023-05-24 RX ORDER — FLUTICASONE PROPIONATE 50 MCG
2 SPRAY, SUSPENSION (ML) NASAL DAILY
COMMUNITY

## 2023-05-24 RX ORDER — NORGESTIMATE AND ETHINYL ESTRADIOL 0.25-0.035
1 KIT ORAL DAILY
COMMUNITY
Start: 2022-11-29

## 2023-05-24 RX ORDER — MONTELUKAST SODIUM 10 MG/1
1 TABLET ORAL DAILY
COMMUNITY
Start: 2022-11-22

## 2023-07-27 ENCOUNTER — TELEPHONE (OUTPATIENT)
Age: 43
End: 2023-07-27

## 2023-07-27 NOTE — TELEPHONE ENCOUNTER
07/27/2023--This user called and spoke with the patient and let her know that there were not any openings tomorrow, but we could see her Monday the 31 st at 1:15 pm. Patient verbalized understanding and appointment made.

## 2023-07-27 NOTE — TELEPHONE ENCOUNTER
Patient is calling states she has a lump near her back/neck. Patient would like to know if she could come in tomorrow.  Please advise [Nutrition] : nutrition [Breast Self Exam] : breast self exam [Exercise] : exercise [Contraception] : contraception [Safe Sexual Practices] : safe sexual practices

## 2023-07-29 SDOH — ECONOMIC STABILITY: TRANSPORTATION INSECURITY
IN THE PAST 12 MONTHS, HAS LACK OF TRANSPORTATION KEPT YOU FROM MEETINGS, WORK, OR FROM GETTING THINGS NEEDED FOR DAILY LIVING?: NO

## 2023-07-29 SDOH — ECONOMIC STABILITY: INCOME INSECURITY: HOW HARD IS IT FOR YOU TO PAY FOR THE VERY BASICS LIKE FOOD, HOUSING, MEDICAL CARE, AND HEATING?: NOT VERY HARD

## 2023-07-29 SDOH — ECONOMIC STABILITY: FOOD INSECURITY: WITHIN THE PAST 12 MONTHS, YOU WORRIED THAT YOUR FOOD WOULD RUN OUT BEFORE YOU GOT MONEY TO BUY MORE.: NEVER TRUE

## 2023-07-29 SDOH — ECONOMIC STABILITY: FOOD INSECURITY: WITHIN THE PAST 12 MONTHS, THE FOOD YOU BOUGHT JUST DIDN'T LAST AND YOU DIDN'T HAVE MONEY TO GET MORE.: NEVER TRUE

## 2023-07-29 SDOH — ECONOMIC STABILITY: HOUSING INSECURITY
IN THE LAST 12 MONTHS, WAS THERE A TIME WHEN YOU DID NOT HAVE A STEADY PLACE TO SLEEP OR SLEPT IN A SHELTER (INCLUDING NOW)?: NO

## 2023-07-31 ENCOUNTER — OFFICE VISIT (OUTPATIENT)
Age: 43
End: 2023-07-31
Payer: COMMERCIAL

## 2023-07-31 VITALS
RESPIRATION RATE: 16 BRPM | BODY MASS INDEX: 34.04 KG/M2 | SYSTOLIC BLOOD PRESSURE: 107 MMHG | HEART RATE: 66 BPM | DIASTOLIC BLOOD PRESSURE: 78 MMHG | TEMPERATURE: 99.2 F | WEIGHT: 211.8 LBS | HEIGHT: 66 IN | OXYGEN SATURATION: 98 %

## 2023-07-31 DIAGNOSIS — R22.2 SUPRACLAVICULAR MASS: ICD-10-CM

## 2023-07-31 DIAGNOSIS — F41.9 ANXIETY: ICD-10-CM

## 2023-07-31 DIAGNOSIS — J06.9 UPPER RESPIRATORY TRACT INFECTION, UNSPECIFIED TYPE: Primary | ICD-10-CM

## 2023-07-31 PROCEDURE — 99214 OFFICE O/P EST MOD 30 MIN: CPT | Performed by: INTERNAL MEDICINE

## 2023-07-31 RX ORDER — AMOXICILLIN AND CLAVULANATE POTASSIUM 875; 125 MG/1; MG/1
1 TABLET, FILM COATED ORAL 2 TIMES DAILY
Qty: 14 TABLET | Refills: 0 | Status: SHIPPED | OUTPATIENT
Start: 2023-07-31 | End: 2023-08-07

## 2023-07-31 RX ORDER — PREDNISONE 20 MG/1
40 TABLET ORAL DAILY
Qty: 10 TABLET | Refills: 0 | Status: SHIPPED | OUTPATIENT
Start: 2023-07-31 | End: 2023-08-05

## 2023-07-31 ASSESSMENT — ENCOUNTER SYMPTOMS
BACK PAIN: 0
EYE ITCHING: 0
ABDOMINAL PAIN: 0
EYE DISCHARGE: 0
CONSTIPATION: 0
SINUS PRESSURE: 1
DIARRHEA: 0
SHORTNESS OF BREATH: 0
CHEST TIGHTNESS: 0

## 2023-07-31 NOTE — PROGRESS NOTES
Nuno Barros is a 41 y.o. female who presents today for Mass (Pt presenting today with a painful lump/mass on (L) shoulder, concerned with having a headache since Saturday 2:00pm did take ibuprofen it did come back more intense in back (L) side of nap area on Sunday morning has taken a pain reliever but headache has lingered. )        She has a history of   Patient Active Problem List   Diagnosis    Pain of toe of left foot    Anxiety   . Today patient is here for an acute visit. Problem visit:  Nuno Barros is here for complaint of neck mass/swelling and URI. Problem began 2 week(s) ago. Severity is moderate  Character of problem: Small firm mass to lower neck/above clavicles. This is new for one week. Has been treated with amoxicillin twice since early may. Still having occasional congestive type symptoms. Patient did have a recent headache that seem to be localized to the left ear. A bit different than previous migraines. Denies any blurry vision. Anxiety: doing well with 50mg. Could not drop this lower. Mood symptoms worsen slightly after decreasing much below 50. She will continue sertraline 50 mg. ROS  Review of Systems   Constitutional:  Negative for fatigue and fever. HENT:  Positive for sinus pressure. Negative for congestion, ear discharge and ear pain. Eyes:  Negative for discharge and itching. Respiratory:  Negative for chest tightness and shortness of breath. Cardiovascular:  Negative for chest pain and leg swelling. Gastrointestinal:  Negative for abdominal pain, constipation and diarrhea. Endocrine: Negative for polydipsia. Genitourinary:  Negative for difficulty urinating, dysuria, enuresis and flank pain. Musculoskeletal:  Negative for arthralgias and back pain. Skin:  Negative for rash. Neurological:  Positive for headaches. Negative for dizziness and weakness. Hematological:  Negative for adenopathy. Does not bruise/bleed easily.

## 2023-11-07 DIAGNOSIS — F41.9 ANXIETY DISORDER, UNSPECIFIED: ICD-10-CM

## 2023-11-26 RX ORDER — MONTELUKAST SODIUM 10 MG/1
TABLET ORAL
Qty: 90 TABLET | Refills: 1 | Status: SHIPPED | OUTPATIENT
Start: 2023-11-26

## 2024-05-09 DIAGNOSIS — F41.9 ANXIETY DISORDER, UNSPECIFIED: ICD-10-CM

## 2024-05-24 RX ORDER — MONTELUKAST SODIUM 10 MG/1
TABLET ORAL
Qty: 90 TABLET | Refills: 0 | Status: SHIPPED | OUTPATIENT
Start: 2024-05-24

## 2024-07-19 ENCOUNTER — HOSPITAL ENCOUNTER (EMERGENCY)
Facility: HOSPITAL | Age: 44
Discharge: HOME | End: 2024-07-19
Attending: EMERGENCY MEDICINE
Payer: COMMERCIAL

## 2024-07-19 ENCOUNTER — APPOINTMENT (OUTPATIENT)
Facility: HOSPITAL | Age: 44
End: 2024-07-19
Payer: COMMERCIAL

## 2024-07-19 VITALS
HEART RATE: 75 BPM | RESPIRATION RATE: 18 BRPM | WEIGHT: 210 LBS | HEIGHT: 66 IN | BODY MASS INDEX: 33.75 KG/M2 | DIASTOLIC BLOOD PRESSURE: 82 MMHG | TEMPERATURE: 98.2 F | OXYGEN SATURATION: 97 % | SYSTOLIC BLOOD PRESSURE: 126 MMHG

## 2024-07-19 DIAGNOSIS — M25.579 PAIN AND SWELLING OF ANKLE, UNSPECIFIED LATERALITY: Primary | ICD-10-CM

## 2024-07-19 DIAGNOSIS — M25.473 PAIN AND SWELLING OF ANKLE, UNSPECIFIED LATERALITY: Primary | ICD-10-CM

## 2024-07-19 PROCEDURE — 6360000002 HC RX W HCPCS: Performed by: EMERGENCY MEDICINE

## 2024-07-19 PROCEDURE — 99284 EMERGENCY DEPT VISIT MOD MDM: CPT

## 2024-07-19 PROCEDURE — 96372 THER/PROPH/DIAG INJ SC/IM: CPT

## 2024-07-19 PROCEDURE — 73630 X-RAY EXAM OF FOOT: CPT

## 2024-07-19 RX ORDER — KETOROLAC TROMETHAMINE 30 MG/ML
30 INJECTION, SOLUTION INTRAMUSCULAR; INTRAVENOUS ONCE
Status: COMPLETED | OUTPATIENT
Start: 2024-07-19 | End: 2024-07-19

## 2024-07-19 RX ORDER — ETODOLAC 200 MG/1
200 CAPSULE ORAL 2 TIMES DAILY
Qty: 10 CAPSULE | Refills: 0 | Status: SHIPPED | OUTPATIENT
Start: 2024-07-19 | End: 2024-07-24

## 2024-07-19 RX ADMIN — KETOROLAC TROMETHAMINE 30 MG: 30 INJECTION, SOLUTION INTRAMUSCULAR at 10:26

## 2024-07-19 ASSESSMENT — PAIN DESCRIPTION - DESCRIPTORS: DESCRIPTORS: SQUEEZING

## 2024-07-19 ASSESSMENT — PAIN - FUNCTIONAL ASSESSMENT: PAIN_FUNCTIONAL_ASSESSMENT: 0-10

## 2024-07-19 ASSESSMENT — PAIN DESCRIPTION - ORIENTATION: ORIENTATION: RIGHT;LEFT

## 2024-07-19 ASSESSMENT — PAIN DESCRIPTION - LOCATION: LOCATION: ANKLE

## 2024-07-19 ASSESSMENT — PAIN SCALES - GENERAL: PAINLEVEL_OUTOF10: 8

## 2024-07-19 NOTE — DISCHARGE INSTRUCTIONS
Routine appointments for health maintenance with a primary care provider are very important and emergency department visits are no substitute.  You should review all findings and test results from your visit today with your primary care physician.        We recommended that you take medications as prescribed.    Please try to rest, use ice, compression and elevation to help with symptoms.    Use ice every 2 hours for 20 minutes to help alleviate inflammation and pain.    You may take 1 or 2 pills of Tylenol every 6 hours as needed for pain or fever.  You should not take this medication with other medications that contain acetaminophen/Tylenol which could include prescription pain medications or other combo over-the-counter medications.  You should not exceed more than 4000 mg in a 24 hour.    Return to the emergency department for any new or concerning signs/symptoms or failure to improve.

## 2024-07-19 NOTE — ED TRIAGE NOTES
Pt arrives to the ED with ankle swelling that started Monday, patient reports it is painful to walk. Pt denies HTN, heart problems, only high cholesterol.

## 2024-07-19 NOTE — ED NOTES
Discussed the importance of follow up care and seeking immediate medical attention for any changes or worsening in the patients condition. Patient verbalized understanding of discharge paperwork, medication use and follow up care.      Patient ambulated out of the ED with a steady gait

## 2024-07-19 NOTE — ED PROVIDER NOTES
EMERGENCY DEPARTMENT PHYSICIAN NOTE     Patient: Trixie Suh     Time of Service: 2024 11:34 AM     Chief complaint:   Chief Complaint   Patient presents with    Joint Swelling        HISTORY:  Patient is a 44 y.o. female who presents to the emergency department with complaints of bilateral ankle swelling and pain.       Past Medical History:   Diagnosis Date    Anxiety     Arthritis 2012    pain in a few places, toes and hands are the worst    High cholesterol     HPV exposure     Routine Papanicolaou smear 2016        Past Surgical History:   Procedure Laterality Date    CYST REMOVAL  2021    top of head    GYN  2013    Cryo    WISDOM TOOTH EXTRACTION          Family History   Problem Relation Age of Onset    No Known Problems Mother     Osteoarthritis Maternal Grandfather             Rheum Arthritis Maternal Grandfather     Heart Disease Father     Elevated Lipids Father     Hypertension Father     High Cholesterol Father         Social History     Socioeconomic History    Marital status:    Tobacco Use    Smoking status: Former     Current packs/day: 0.00     Average packs/day: 0.3 packs/day for 2.7 years (0.7 ttl pk-yrs)     Types: Cigarettes     Start date: 2005     Quit date: 2008     Years since quittin.5    Smokeless tobacco: Never   Substance and Sexual Activity    Alcohol use: Yes    Drug use: Never     Social Determinants of Health     Financial Resource Strain: Low Risk  (2023)    Overall Financial Resource Strain (CARDIA)     Difficulty of Paying Living Expenses: Not very hard   Transportation Needs: Unknown (2023)    PRAPARE - Transportation     Lack of Transportation (Non-Medical): No   Housing Stability: Unknown (2023)    Housing Stability Vital Sign     Unstable Housing in the Last Year: No        Current Medications: Reviewed in chart.    Allergies: No Known Allergies       REVIEW OF SYSTEMS: See HPI for pertinent positives

## 2024-07-23 ENCOUNTER — OFFICE VISIT (OUTPATIENT)
Age: 44
End: 2024-07-23
Payer: COMMERCIAL

## 2024-07-23 VITALS
BODY MASS INDEX: 33.62 KG/M2 | WEIGHT: 209.2 LBS | OXYGEN SATURATION: 97 % | DIASTOLIC BLOOD PRESSURE: 74 MMHG | RESPIRATION RATE: 16 BRPM | HEIGHT: 66 IN | SYSTOLIC BLOOD PRESSURE: 120 MMHG | HEART RATE: 84 BPM | TEMPERATURE: 98.3 F

## 2024-07-23 DIAGNOSIS — M25.579 PAIN AND SWELLING OF ANKLE, UNSPECIFIED LATERALITY: ICD-10-CM

## 2024-07-23 DIAGNOSIS — E78.2 MIXED HYPERLIPIDEMIA: ICD-10-CM

## 2024-07-23 DIAGNOSIS — L03.119 CELLULITIS OF ANKLE: ICD-10-CM

## 2024-07-23 DIAGNOSIS — E66.09 CLASS 1 OBESITY DUE TO EXCESS CALORIES WITHOUT SERIOUS COMORBIDITY WITH BODY MASS INDEX (BMI) OF 33.0 TO 33.9 IN ADULT: ICD-10-CM

## 2024-07-23 DIAGNOSIS — M25.473 PAIN AND SWELLING OF ANKLE, UNSPECIFIED LATERALITY: ICD-10-CM

## 2024-07-23 LAB — HBA1C MFR BLD: 5.5 %

## 2024-07-23 PROCEDURE — 83036 HEMOGLOBIN GLYCOSYLATED A1C: CPT | Performed by: NURSE PRACTITIONER

## 2024-07-23 PROCEDURE — 99214 OFFICE O/P EST MOD 30 MIN: CPT | Performed by: NURSE PRACTITIONER

## 2024-07-23 RX ORDER — CEPHALEXIN 500 MG/1
500 CAPSULE ORAL 4 TIMES DAILY
Qty: 28 CAPSULE | Refills: 0 | Status: SHIPPED | OUTPATIENT
Start: 2024-07-23 | End: 2024-07-30

## 2024-07-23 ASSESSMENT — ENCOUNTER SYMPTOMS
EYE REDNESS: 0
ABDOMINAL PAIN: 0
BACK PAIN: 0
SINUS PRESSURE: 0
EYE PAIN: 0
VOMITING: 0
CHEST TIGHTNESS: 0
NAUSEA: 0
RHINORRHEA: 0
CONSTIPATION: 0
DIARRHEA: 0
SINUS PAIN: 0
RESPIRATORY NEGATIVE: 1
SHORTNESS OF BREATH: 0
BLOOD IN STOOL: 0
COUGH: 0
GASTROINTESTINAL NEGATIVE: 1
EYES NEGATIVE: 1

## 2024-07-23 NOTE — PROGRESS NOTES
presents today for ER visit follow up. Pt presented to [place] [facility] ER due to bilateral ankle pain and edema for 3 days. Symptoms started a week after she took a dance class. Denies pain or injuries during dance class. She does not work out often but stays active doing chores at home. Pt had bilateral ankle x-rays with no acute findings. She was given Ketorolac IM injection with some relief. She was discharged home with PO Etodolac which she has been taking. However, she reports pain medication is not helping with pain and she continues to have bilateral ankle edema. She has also developed mild redness to bilateral ankle noted over the weekend. Reports edema has worsen. She reports walking around her house has become painful. Denies shortness of breath, dizziness, fever, chills.       Review of Systems  Review of Systems   Constitutional: Negative.  Negative for chills, fatigue, fever and unexpected weight change.   HENT: Negative.  Negative for congestion, rhinorrhea, sinus pressure and sinus pain.    Eyes: Negative.  Negative for pain, redness and visual disturbance.   Respiratory: Negative.  Negative for cough, chest tightness and shortness of breath.    Cardiovascular:  Positive for leg swelling. Negative for chest pain and palpitations.   Gastrointestinal: Negative.  Negative for abdominal pain, blood in stool, constipation, diarrhea, nausea and vomiting.   Endocrine: Negative.  Negative for polydipsia, polyphagia and polyuria.   Genitourinary: Negative.  Negative for difficulty urinating, dysuria, frequency and urgency.   Musculoskeletal: Negative.  Negative for back pain.   Neurological: Negative.  Negative for dizziness, light-headedness and headaches.   Psychiatric/Behavioral: Negative.  Negative for agitation, behavioral problems, sleep disturbance and suicidal ideas. The patient is not nervous/anxious.      Past Medical History   No Known Allergies     Current Outpatient Medications   Medication Sig

## 2024-07-30 SDOH — ECONOMIC STABILITY: FOOD INSECURITY: WITHIN THE PAST 12 MONTHS, YOU WORRIED THAT YOUR FOOD WOULD RUN OUT BEFORE YOU GOT MONEY TO BUY MORE.: NEVER TRUE

## 2024-07-30 SDOH — ECONOMIC STABILITY: INCOME INSECURITY: HOW HARD IS IT FOR YOU TO PAY FOR THE VERY BASICS LIKE FOOD, HOUSING, MEDICAL CARE, AND HEATING?: NOT VERY HARD

## 2024-07-30 SDOH — ECONOMIC STABILITY: FOOD INSECURITY: WITHIN THE PAST 12 MONTHS, THE FOOD YOU BOUGHT JUST DIDN'T LAST AND YOU DIDN'T HAVE MONEY TO GET MORE.: NEVER TRUE

## 2024-08-01 ENCOUNTER — TELEMEDICINE (OUTPATIENT)
Age: 44
End: 2024-08-01

## 2024-08-01 DIAGNOSIS — Z02.89 ENCOUNTER FOR COMPLETION OF FORM WITH PATIENT: ICD-10-CM

## 2024-08-01 DIAGNOSIS — F41.9 ANXIETY: ICD-10-CM

## 2024-08-01 DIAGNOSIS — M25.473 PAIN AND SWELLING OF ANKLE, UNSPECIFIED LATERALITY: ICD-10-CM

## 2024-08-01 DIAGNOSIS — L52 ERYTHEMA NODOSUM: Primary | ICD-10-CM

## 2024-08-01 DIAGNOSIS — M25.579 PAIN AND SWELLING OF ANKLE, UNSPECIFIED LATERALITY: ICD-10-CM

## 2024-08-01 RX ORDER — IBUPROFEN 800 MG/1
800 TABLET ORAL
COMMUNITY
Start: 2024-07-24

## 2024-08-01 RX ORDER — PREDNISONE 20 MG/1
20 TABLET ORAL DAILY
Qty: 10 TABLET | Refills: 0 | Status: SHIPPED | OUTPATIENT
Start: 2024-08-01 | End: 2024-08-11

## 2024-08-01 ASSESSMENT — ENCOUNTER SYMPTOMS
SHORTNESS OF BREATH: 0
BACK PAIN: 0
ABDOMINAL PAIN: 0
DIARRHEA: 0
CHEST TIGHTNESS: 0
CONSTIPATION: 0

## 2024-08-01 ASSESSMENT — PATIENT HEALTH QUESTIONNAIRE - PHQ9
SUM OF ALL RESPONSES TO PHQ QUESTIONS 1-9: 0
SUM OF ALL RESPONSES TO PHQ QUESTIONS 1-9: 0
2. FEELING DOWN, DEPRESSED OR HOPELESS: NOT AT ALL
SUM OF ALL RESPONSES TO PHQ QUESTIONS 1-9: 0
1. LITTLE INTEREST OR PLEASURE IN DOING THINGS: NOT AT ALL
SUM OF ALL RESPONSES TO PHQ QUESTIONS 1-9: 0
SUM OF ALL RESPONSES TO PHQ9 QUESTIONS 1 & 2: 0

## 2024-08-01 NOTE — PROGRESS NOTES
Trixie Suh was seen on 8/1/2024 using synchronous (real-time) audio-video technology; Anish.     Consent: Trixie Suh, who was seen by synchronous (real-time) audio-video technology, and/or her healthcare decision maker, is aware that this patient-initiated, Telehealth encounter on 8/1/2024 is a billable service, with coverage as determined by her insurance carrier. She is aware that she may receive a bill and has provided verbal consent to proceed: In the last 12 months: Yes.       I was in the office while conducting this encounter.    Trixie Suh is a 44 y.o. female who presents today for Follow-up (Pt went to  ER due to pain and swelling; pt got dx with Erythema nodosum)  .    She has a history of   Patient Active Problem List   Diagnosis    Pain of toe of left foot    Anxiety   .    Today patient is being seen for an acute visit.     Erythema nodosum: Diagnosed at [facility] ER on 23 July.  She had previously been seen in our ER on 19 July.  X-rays done of bilateral feet was unremarkable.  She was sent home with NSAIDs.  She did did follow-up with our nurse practitioner on the 23rd.  Given new signs of swelling, blood work was ordered.  This was not done on that day.  She then went to the ER later that night.  Given clinical symptoms along with elevated inflammatory markers she was diagnosed with erythema nodosum.  Some concern that birth control may be causing her symptoms.  Since she has stopped her birth control and she reports slow improvements of her symptoms.  Other labs in the ER did show a mild elevated white blood cell count.  Otherwise chest x-ray was negative.   Legs are still quite tender if she is walking on them too much.  Has been out of work until the 31st.  Resumed work this week but from home.  Still has some discoloration to her lower extremities.    Would like [place] paperwork filled [place].  She is able to elevate her legs while she works from home.  We will try to keep her

## 2024-08-02 ENCOUNTER — TELEPHONE (OUTPATIENT)
Age: 44
End: 2024-08-02

## 2024-08-02 NOTE — TELEPHONE ENCOUNTER
----- Message from Jonas Pimentel LPN sent at 8/2/2024  7:50 AM EDT -----  Please and thank you!!    ----- Message -----  From: Duglas Elizalde MD  Sent: 8/1/2024   4:33 PM EDT  To: Jonas Pimentel LPN    Can you call and schedule her to see me in the office for 1 to 2-week follow-up to see what her legs look like.

## 2024-08-15 ENCOUNTER — OFFICE VISIT (OUTPATIENT)
Age: 44
End: 2024-08-15
Payer: COMMERCIAL

## 2024-08-15 VITALS
RESPIRATION RATE: 16 BRPM | OXYGEN SATURATION: 97 % | SYSTOLIC BLOOD PRESSURE: 116 MMHG | BODY MASS INDEX: 33.11 KG/M2 | WEIGHT: 206 LBS | DIASTOLIC BLOOD PRESSURE: 82 MMHG | HEART RATE: 75 BPM | HEIGHT: 66 IN | TEMPERATURE: 98.2 F

## 2024-08-15 DIAGNOSIS — L52 ERYTHEMA NODOSUM: Primary | ICD-10-CM

## 2024-08-15 DIAGNOSIS — G47.33 OSA (OBSTRUCTIVE SLEEP APNEA): ICD-10-CM

## 2024-08-15 DIAGNOSIS — F41.9 ANXIETY: ICD-10-CM

## 2024-08-15 PROCEDURE — 99213 OFFICE O/P EST LOW 20 MIN: CPT | Performed by: INTERNAL MEDICINE

## 2024-08-15 ASSESSMENT — ENCOUNTER SYMPTOMS
BACK PAIN: 0
CONSTIPATION: 0
CHEST TIGHTNESS: 0
ABDOMINAL PAIN: 0
SHORTNESS OF BREATH: 0
DIARRHEA: 0

## 2024-08-15 NOTE — PROGRESS NOTES
Trixie Suh is a 44 y.o. female who presents today for Follow-up (2 week f/u swelling; per pt states swelling is improving; pt states bilateral feet are still a little sore; right foot worsen )  .      She has a history of   Patient Active Problem List   Diagnosis    Pain of toe of left foot    Anxiety   .    Today patient is here for follow-up.     Leg pain and swelling: Patient was diagnosed with erythema nodosum in the ER.  When we last saw her she had improved, but still having some swelling and discomfort.  We did place her on 10 days of prednisone.  Since she reports greatly improved.  Still occasional discomfort if she has been on her feet for too long, but overall much better.  We did fill out FMLA to keep her working from home so that she could elevate her legs.  She has also stopped taking her oral contraceptive.    CUCA: Mild Waiting to get CPAP.     Mood remains stable.  Try to cut back on sertraline and mood got worse.  Continue current therapy    ROS  Review of Systems   Constitutional:  Negative for fatigue and fever.   HENT:  Negative for congestion and ear pain.    Respiratory:  Negative for chest tightness and shortness of breath.    Cardiovascular:  Negative for chest pain and leg swelling.   Gastrointestinal:  Negative for abdominal pain, constipation and diarrhea.   Endocrine: Negative for polydipsia.   Genitourinary:  Negative for difficulty urinating and dysuria.   Musculoskeletal:  Negative for arthralgias and back pain.   Skin:  Negative for rash.   Neurological:  Negative for dizziness and headaches.   Psychiatric/Behavioral:  Negative for agitation. The patient is not nervous/anxious.          Vitals:    08/15/24 0921   BP: 116/82   Pulse: 75   Resp: 16   Temp: 98.2 °F (36.8 °C)   SpO2: 97%       Physical Exam  Constitutional:       Appearance: Normal appearance.   HENT:      Head: Normocephalic.      Right Ear: Tympanic membrane normal.      Left Ear: Tympanic membrane normal.

## 2024-08-23 RX ORDER — MONTELUKAST SODIUM 10 MG/1
TABLET ORAL
Qty: 90 TABLET | Refills: 0 | Status: SHIPPED | OUTPATIENT
Start: 2024-08-23

## 2024-11-07 DIAGNOSIS — F41.9 ANXIETY DISORDER, UNSPECIFIED: ICD-10-CM

## 2025-05-04 DIAGNOSIS — F41.9 ANXIETY DISORDER, UNSPECIFIED: ICD-10-CM
